# Patient Record
Sex: MALE | Race: WHITE | Employment: FULL TIME | URBAN - METROPOLITAN AREA
[De-identification: names, ages, dates, MRNs, and addresses within clinical notes are randomized per-mention and may not be internally consistent; named-entity substitution may affect disease eponyms.]

---

## 2022-04-18 ENCOUNTER — HOSPITAL ENCOUNTER (INPATIENT)
Age: 62
LOS: 2 days | Discharge: HOME | DRG: 694 | End: 2022-04-20
Attending: HOSPITALIST | Admitting: HOSPITALIST

## 2022-04-18 ENCOUNTER — APPOINTMENT (OUTPATIENT)
Dept: CT IMAGING | Age: 62
DRG: 694 | End: 2022-04-18

## 2022-04-18 DIAGNOSIS — N17.9 ACUTE KIDNEY INJURY (HCC): Primary | ICD-10-CM

## 2022-04-18 DIAGNOSIS — N20.1 CALCULUS OF URETEROVESICAL JUNCTION (UVJ): ICD-10-CM

## 2022-04-18 DIAGNOSIS — Q62.11 HYDRONEPHROSIS WITH URETEROPELVIC JUNCTION (UPJ) OBSTRUCTION: ICD-10-CM

## 2022-04-18 LAB
A/G RATIO: 1.2 (ref 1.1–2.2)
ALBUMIN SERPL-MCNC: 4.5 G/DL (ref 3.4–5)
ALP BLD-CCNC: 81 U/L (ref 40–129)
ALT SERPL-CCNC: 35 U/L (ref 10–40)
ANION GAP SERPL CALCULATED.3IONS-SCNC: 17 MMOL/L (ref 3–16)
AST SERPL-CCNC: 42 U/L (ref 15–37)
BASOPHILS ABSOLUTE: 0 K/UL (ref 0–0.2)
BASOPHILS RELATIVE PERCENT: 0.2 %
BILIRUB SERPL-MCNC: 1.3 MG/DL (ref 0–1)
BILIRUBIN URINE: NEGATIVE
BLOOD, URINE: ABNORMAL
BUN BLDV-MCNC: 34 MG/DL (ref 7–20)
CALCIUM SERPL-MCNC: 10.4 MG/DL (ref 8.3–10.6)
CHLORIDE BLD-SCNC: 96 MMOL/L (ref 99–110)
CLARITY: CLEAR
CO2: 22 MMOL/L (ref 21–32)
COLOR: YELLOW
CREAT SERPL-MCNC: 2.7 MG/DL (ref 0.8–1.3)
EOSINOPHILS ABSOLUTE: 0 K/UL (ref 0–0.6)
EOSINOPHILS RELATIVE PERCENT: 0 %
EPITHELIAL CELLS, UA: 2 /HPF (ref 0–5)
GFR AFRICAN AMERICAN: 29
GFR NON-AFRICAN AMERICAN: 24
GLUCOSE BLD-MCNC: 123 MG/DL (ref 70–99)
GLUCOSE URINE: 100 MG/DL
HCT VFR BLD CALC: 50.6 % (ref 40.5–52.5)
HEMOGLOBIN: 16.8 G/DL (ref 13.5–17.5)
HYALINE CASTS: 2 /LPF (ref 0–8)
KETONES, URINE: ABNORMAL MG/DL
LEUKOCYTE ESTERASE, URINE: NEGATIVE
LIPASE: 372 U/L (ref 13–60)
LYMPHOCYTES ABSOLUTE: 1.5 K/UL (ref 1–5.1)
LYMPHOCYTES RELATIVE PERCENT: 7.6 %
MCH RBC QN AUTO: 30.7 PG (ref 26–34)
MCHC RBC AUTO-ENTMCNC: 33.1 G/DL (ref 31–36)
MCV RBC AUTO: 92.5 FL (ref 80–100)
MICROSCOPIC EXAMINATION: YES
MONOCYTES ABSOLUTE: 1.5 K/UL (ref 0–1.3)
MONOCYTES RELATIVE PERCENT: 7.4 %
NEUTROPHILS ABSOLUTE: 17.1 K/UL (ref 1.7–7.7)
NEUTROPHILS RELATIVE PERCENT: 84.8 %
NITRITE, URINE: NEGATIVE
PDW BLD-RTO: 13.6 % (ref 12.4–15.4)
PH UA: 6 (ref 5–8)
PLATELET # BLD: 278 K/UL (ref 135–450)
PMV BLD AUTO: 7.8 FL (ref 5–10.5)
POTASSIUM REFLEX MAGNESIUM: 4.2 MMOL/L (ref 3.5–5.1)
PROTEIN UA: 100 MG/DL
RBC # BLD: 5.47 M/UL (ref 4.2–5.9)
RBC UA: 52 /HPF (ref 0–4)
SODIUM BLD-SCNC: 135 MMOL/L (ref 136–145)
SPECIFIC GRAVITY UA: 1.02 (ref 1–1.03)
TOTAL PROTEIN: 8.3 G/DL (ref 6.4–8.2)
URINE REFLEX TO CULTURE: ABNORMAL
URINE TYPE: ABNORMAL
UROBILINOGEN, URINE: 0.2 E.U./DL
WBC # BLD: 20.1 K/UL (ref 4–11)
WBC UA: 5 /HPF (ref 0–5)

## 2022-04-18 PROCEDURE — 96372 THER/PROPH/DIAG INJ SC/IM: CPT

## 2022-04-18 PROCEDURE — 99284 EMERGENCY DEPT VISIT MOD MDM: CPT

## 2022-04-18 PROCEDURE — 81001 URINALYSIS AUTO W/SCOPE: CPT

## 2022-04-18 PROCEDURE — 2580000003 HC RX 258: Performed by: HOSPITALIST

## 2022-04-18 PROCEDURE — 93005 ELECTROCARDIOGRAM TRACING: CPT | Performed by: NURSE PRACTITIONER

## 2022-04-18 PROCEDURE — 1200000000 HC SEMI PRIVATE

## 2022-04-18 PROCEDURE — 80053 COMPREHEN METABOLIC PANEL: CPT

## 2022-04-18 PROCEDURE — 6370000000 HC RX 637 (ALT 250 FOR IP): Performed by: NURSE PRACTITIONER

## 2022-04-18 PROCEDURE — 6360000002 HC RX W HCPCS: Performed by: NURSE PRACTITIONER

## 2022-04-18 PROCEDURE — 85025 COMPLETE CBC W/AUTO DIFF WBC: CPT

## 2022-04-18 PROCEDURE — 83690 ASSAY OF LIPASE: CPT

## 2022-04-18 PROCEDURE — 74176 CT ABD & PELVIS W/O CONTRAST: CPT

## 2022-04-18 PROCEDURE — 2580000003 HC RX 258: Performed by: NURSE PRACTITIONER

## 2022-04-18 PROCEDURE — 36415 COLL VENOUS BLD VENIPUNCTURE: CPT

## 2022-04-18 RX ORDER — ONDANSETRON 2 MG/ML
4 INJECTION INTRAMUSCULAR; INTRAVENOUS ONCE
Status: COMPLETED | OUTPATIENT
Start: 2022-04-18 | End: 2022-04-18

## 2022-04-18 RX ORDER — SODIUM CHLORIDE 0.9 % (FLUSH) 0.9 %
5-40 SYRINGE (ML) INJECTION PRN
Status: DISCONTINUED | OUTPATIENT
Start: 2022-04-18 | End: 2022-04-20 | Stop reason: HOSPADM

## 2022-04-18 RX ORDER — ORPHENADRINE CITRATE 30 MG/ML
60 INJECTION INTRAMUSCULAR; INTRAVENOUS ONCE
Status: COMPLETED | OUTPATIENT
Start: 2022-04-18 | End: 2022-04-18

## 2022-04-18 RX ORDER — ONDANSETRON 4 MG/1
4 TABLET, ORALLY DISINTEGRATING ORAL EVERY 8 HOURS PRN
Status: DISCONTINUED | OUTPATIENT
Start: 2022-04-18 | End: 2022-04-20 | Stop reason: HOSPADM

## 2022-04-18 RX ORDER — SODIUM CHLORIDE 9 MG/ML
INJECTION, SOLUTION INTRAVENOUS CONTINUOUS
Status: DISCONTINUED | OUTPATIENT
Start: 2022-04-18 | End: 2022-04-20 | Stop reason: HOSPADM

## 2022-04-18 RX ORDER — KETOROLAC TROMETHAMINE 30 MG/ML
30 INJECTION, SOLUTION INTRAMUSCULAR; INTRAVENOUS ONCE
Status: DISCONTINUED | OUTPATIENT
Start: 2022-04-18 | End: 2022-04-18

## 2022-04-18 RX ORDER — MORPHINE SULFATE 4 MG/ML
4 INJECTION, SOLUTION INTRAMUSCULAR; INTRAVENOUS EVERY 30 MIN PRN
Status: DISCONTINUED | OUTPATIENT
Start: 2022-04-18 | End: 2022-04-18

## 2022-04-18 RX ORDER — SODIUM CHLORIDE 0.9 % (FLUSH) 0.9 %
5-40 SYRINGE (ML) INJECTION EVERY 12 HOURS SCHEDULED
Status: DISCONTINUED | OUTPATIENT
Start: 2022-04-18 | End: 2022-04-20 | Stop reason: HOSPADM

## 2022-04-18 RX ORDER — KETOROLAC TROMETHAMINE 30 MG/ML
30 INJECTION, SOLUTION INTRAMUSCULAR; INTRAVENOUS ONCE
Status: COMPLETED | OUTPATIENT
Start: 2022-04-18 | End: 2022-04-18

## 2022-04-18 RX ORDER — SODIUM CHLORIDE 9 MG/ML
INJECTION, SOLUTION INTRAVENOUS PRN
Status: DISCONTINUED | OUTPATIENT
Start: 2022-04-18 | End: 2022-04-20 | Stop reason: HOSPADM

## 2022-04-18 RX ORDER — MORPHINE SULFATE 2 MG/ML
2 INJECTION, SOLUTION INTRAMUSCULAR; INTRAVENOUS EVERY 4 HOURS PRN
Status: DISCONTINUED | OUTPATIENT
Start: 2022-04-18 | End: 2022-04-20 | Stop reason: HOSPADM

## 2022-04-18 RX ORDER — OXYCODONE HYDROCHLORIDE AND ACETAMINOPHEN 5; 325 MG/1; MG/1
1 TABLET ORAL EVERY 4 HOURS PRN
Status: DISCONTINUED | OUTPATIENT
Start: 2022-04-18 | End: 2022-04-20 | Stop reason: HOSPADM

## 2022-04-18 RX ORDER — POLYETHYLENE GLYCOL 3350 17 G/17G
17 POWDER, FOR SOLUTION ORAL DAILY PRN
Status: DISCONTINUED | OUTPATIENT
Start: 2022-04-18 | End: 2022-04-20 | Stop reason: HOSPADM

## 2022-04-18 RX ORDER — ONDANSETRON 4 MG/1
4 TABLET, ORALLY DISINTEGRATING ORAL ONCE
Status: COMPLETED | OUTPATIENT
Start: 2022-04-18 | End: 2022-04-18

## 2022-04-18 RX ORDER — 0.9 % SODIUM CHLORIDE 0.9 %
1000 INTRAVENOUS SOLUTION INTRAVENOUS ONCE
Status: COMPLETED | OUTPATIENT
Start: 2022-04-18 | End: 2022-04-18

## 2022-04-18 RX ORDER — ACETAMINOPHEN 650 MG/1
650 SUPPOSITORY RECTAL EVERY 6 HOURS PRN
Status: DISCONTINUED | OUTPATIENT
Start: 2022-04-18 | End: 2022-04-20 | Stop reason: HOSPADM

## 2022-04-18 RX ORDER — ONDANSETRON 2 MG/ML
4 INJECTION INTRAMUSCULAR; INTRAVENOUS EVERY 6 HOURS PRN
Status: DISCONTINUED | OUTPATIENT
Start: 2022-04-18 | End: 2022-04-20 | Stop reason: HOSPADM

## 2022-04-18 RX ORDER — ACETAMINOPHEN 325 MG/1
650 TABLET ORAL EVERY 6 HOURS PRN
Status: DISCONTINUED | OUTPATIENT
Start: 2022-04-18 | End: 2022-04-20 | Stop reason: HOSPADM

## 2022-04-18 RX ADMIN — KETOROLAC TROMETHAMINE 30 MG: 30 INJECTION, SOLUTION INTRAMUSCULAR at 15:36

## 2022-04-18 RX ADMIN — ONDANSETRON 4 MG: 2 INJECTION INTRAMUSCULAR; INTRAVENOUS at 18:47

## 2022-04-18 RX ADMIN — SODIUM CHLORIDE 1000 ML: 9 INJECTION, SOLUTION INTRAVENOUS at 18:48

## 2022-04-18 RX ADMIN — MORPHINE SULFATE 4 MG: 4 INJECTION INTRAVENOUS at 19:47

## 2022-04-18 RX ADMIN — ORPHENADRINE CITRATE 60 MG: 30 INJECTION INTRAMUSCULAR; INTRAVENOUS at 15:35

## 2022-04-18 RX ADMIN — ONDANSETRON 4 MG: 4 TABLET, ORALLY DISINTEGRATING ORAL at 15:36

## 2022-04-18 RX ADMIN — SODIUM CHLORIDE, PRESERVATIVE FREE 10 ML: 5 INJECTION INTRAVENOUS at 22:14

## 2022-04-18 RX ADMIN — SODIUM CHLORIDE: 9 INJECTION, SOLUTION INTRAVENOUS at 22:13

## 2022-04-18 ASSESSMENT — PAIN SCALES - GENERAL
PAINLEVEL_OUTOF10: 9
PAINLEVEL_OUTOF10: 0
PAINLEVEL_OUTOF10: 8
PAINLEVEL_OUTOF10: 0

## 2022-04-18 ASSESSMENT — ENCOUNTER SYMPTOMS
ABDOMINAL PAIN: 1
COLOR CHANGE: 0
DIARRHEA: 0
WHEEZING: 0
SHORTNESS OF BREATH: 0
NAUSEA: 0
BACK PAIN: 0
VOMITING: 0
COUGH: 0

## 2022-04-18 ASSESSMENT — PAIN - FUNCTIONAL ASSESSMENT: PAIN_FUNCTIONAL_ASSESSMENT: 0-10

## 2022-04-18 NOTE — PROGRESS NOTES
Pt seen in  ED, admission completed. Pt is alert and oriented x 4. Pt lives at home alone, and is being admitted for JOE. Plan of care updated, all questions answered.

## 2022-04-18 NOTE — ED PROVIDER NOTES
**ADVANCED PRACTICE PROVIDER, I HAVE EVALUATED THIS PATIENT**        [address] ENCOUNTER      Pt Name: Sukhdeep Fabian  IYL:[id number]  Andre 1960  Date of evaluation: 4/18/2022  Provider: Wilfrid Schilder, APRN - CNP      Chief Complaint:    Chief Complaint   Patient presents with    Flank Pain     Left flank pain radiating to the groin with difficulty urination for two days; fevers, vomitting & has not urinated in 5 hours. Hx of kidney stones with lithotripsy in past.           Nursing Notes, Past Medical Hx, Past Surgical Hx, Social Hx, Allergies, and Family Hx were all reviewed and agreed with or any disagreements were addressed in the HPI.    HPI: (Location, Duration, Timing, Severity, Quality, Assoc Sx, Context, Modifying factors)    Chief Complaint of left flank pain that wraps around to the abdomen    This is a  57 y.o. male who presents to the emergency department left flank pain that wraps around to the abdomen that started about 2 days ago however its gotten worse in the past 24 hours, has not been able to urinate for the past 5 hours, thinks he has a kidney stone again, he has had lithotripsies and stones in the past.  He reports nausea and vomiting but no diarrhea or blood in stool. Had a bowel movement but its been several days, he thought maybe he was constipated to. He is not taking any medicine for his pain. No cough, congestion, fever or chills. Rates the pain a 9 on a 10, has not taken any medicine but some Tylenol for the pain. Patient is a  he has not been to the doctor since 2015. He denies any cough, congestion, fever or chills. He denies any additional complaints, no additional aggravating relieving factors. Patient presents awake, alert and in no acute respiratory distress or toxic appearance    PastMedical/Surgical History:  History reviewed. No pertinent past medical history.       Procedure Laterality Date    LITHOTRIPSY         Medications:  Previous Medications    No medications on file         Review of Systems:  (2-9 systems needed)  Review of Systems   Constitutional: Negative for chills and fever. HENT: Negative for congestion. Respiratory: Negative for cough, shortness of breath and wheezing. Cardiovascular: Negative for chest pain. Gastrointestinal: Positive for abdominal pain. Negative for diarrhea, nausea and vomiting. Patient complains of left flank pain that wraps around to the abdomen that started about 2 days ago however its gotten worse in the past 24 hours, has not been able to urinate for the past 5 hours, thinks he has a kidney stone again, he has had lithotripsies and stones in the past.  He reports nausea and vomiting but no diarrhea or blood in stool. Genitourinary: Positive for flank pain. Negative for difficulty urinating, dysuria, frequency, hematuria and urgency. Musculoskeletal: Negative for back pain. Skin: Negative for color change. Neurological: Negative for weakness, numbness and headaches. \"Positives and Pertinent negatives as per HPI\"    Physical Exam:  Physical Exam  Vitals and nursing note reviewed. Constitutional:       Appearance: He is well-developed. He is not diaphoretic. HENT:      Head: Normocephalic. Right Ear: External ear normal.      Left Ear: External ear normal.   Eyes:      General: No scleral icterus. Right eye: No discharge. Left eye: No discharge. Conjunctiva/sclera: Conjunctivae normal.   Cardiovascular:      Comments: Normal S1 and 2, initially tachycardic at 118 bpm, however he is in pain and appears to be slightly agitated due to the pain. However he has no peripheral edema  Pulmonary:      Effort: Pulmonary effort is normal. No respiratory distress.       Comments: Lungs are clear anteriorly, diminished posteriorly in the bases, he is not tachypneic or dyspneic and saturations are 96% on  Abdominal:      General: Bowel sounds are normal.      Tenderness: There is right CVA tenderness and left CVA tenderness. Comments: Abdomen is soft and nondistended. Bowel sounds are active, he does have right and left-sided CVA tenderness with the left being greater than the right. He has no rebound tenderness on exam.  No ascites or rigidity. No rebound tenderness at McBurney's point   Musculoskeletal:         General: Normal range of motion. Cervical back: Normal range of motion and neck supple. Skin:     General: Skin is warm. Coloration: Skin is not pale. Neurological:      General: No focal deficit present. Mental Status: He is alert and oriented to person, place, and time. GCS: GCS eye subscore is 4. GCS verbal subscore is 5. GCS motor subscore is 6.       Comments: Patient is awake, alert following commands correctly, neurology intact no focal deficit   Psychiatric:         Behavior: Behavior normal.         MEDICAL DECISION MAKING    Vitals:    Vitals:    04/18/22 1526 04/18/22 1822   BP: (!) 158/105 (!) 141/97   Pulse: 125 120   Resp: 22    Temp: 98.5 °F (36.9 °C)    SpO2: 96% 94%   Weight: 204 lb (92.5 kg)        LABS:  Labs Reviewed   CBC WITH AUTO DIFFERENTIAL - Abnormal; Notable for the following components:       Result Value    WBC 20.1 (*)     Neutrophils Absolute 17.1 (*)     Monocytes Absolute 1.5 (*)     All other components within normal limits   COMPREHENSIVE METABOLIC PANEL W/ REFLEX TO MG FOR LOW K - Abnormal; Notable for the following components:    Sodium 135 (*)     Chloride 96 (*)     Anion Gap 17 (*)     Glucose 123 (*)     BUN 34 (*)     CREATININE 2.7 (*)     GFR Non- 24 (*)     GFR  29 (*)     Total Protein 8.3 (*)     Total Bilirubin 1.3 (*)     AST 42 (*)     All other components within normal limits   URINALYSIS WITH REFLEX TO CULTURE - Abnormal; Notable for the following components:    Glucose, Ur 100 (*) Ketones, Urine TRACE (*)     Blood, Urine SMALL (*)     Protein,  (*)     All other components within normal limits   LIPASE - Abnormal; Notable for the following components:    Lipase 372.0 (*)     All other components within normal limits   MICROSCOPIC URINALYSIS - Abnormal; Notable for the following components:    RBC, UA 52 (*)     All other components within normal limits        Remainder of labs reviewed and were negative at this time or not returned at the time of this note. RADIOLOGY:   Non-plain film images such as CT, Ultrasound and MRI are read by the radiologist. Areli MURDOCK, APRN - CNP have directly visualized the radiologic plain film image(s) with the below findings:      Interpretation per the Radiologist below, if available at the time of this note:    CT ABDOMEN PELVIS WO CONTRAST Additional Contrast? None   Final Result   Mild-to-moderate hydronephrosis and mild hydroureter bilaterally seen down to   the bladder which is more prominent on the right. There is a 15 mm stone   lodged in the ureteral vesicle junction on the right. Moderate distention of the urinary bladder which may represent an element of   bladder outlet obstruction with moderate prostatic enlargement. Recommend   urological follow-up. Questionable diffuse mild ileus. Status post cholecystectomy with calcifications throughout the pancreas in   keeping with chronic calcific pancreatitis with no obvious active   inflammation or mass. Moderate adrenal thickening on the left which could be due to hyperplasia. Recommend correlating with lab values. Multiple cystic masses in both kidneys with a 4 cm hyperdense mass upper pole   left kidney which probably represents a hemorrhagic cyst.  Recommend   ultrasound correlation.                  MEDICAL DECISION MAKING / ED COURSE:    Because of high probability of sudden clinical deterioration of the patient's condition and risk of further deterioration, critical care time required my full attention to the patient's condition; which included chart data review, documentation, medication ordering, reviewing the patient's old records, reevaluation patient's cardiac, pulmonary and neurological status. Reevaluation of vital signs. Consultations with ED attending and admitting physician. Ordering, interpreting reviewing diagnostic testing. Therefore a critical care time was 35 minutes of direct attention to the patient's condition did not include time spent on procedures. PROCEDURES:   Procedures    None    Patient was given:  Medications   0.9 % sodium chloride bolus (has no administration in time range)   ondansetron (ZOFRAN) injection 4 mg (has no administration in time range)   morphine injection 4 mg (has no administration in time range)   ketorolac (TORADOL) injection 30 mg (30 mg IntraMUSCular Given 4/18/22 1536)   orphenadrine (NORFLEX) injection 60 mg (60 mg IntraMUSCular Given 4/18/22 1535)   ondansetron (ZOFRAN-ODT) disintegrating tablet 4 mg (4 mg Oral Given 4/18/22 1536)     Patient complains of left flank pain that wraps around to the abdomen that started about 2 days ago however its gotten worse in the past 24 hours, has not been able to urinate for the past 5 hours, thinks he has a kidney stone again, he has had lithotripsies and stones in the past.  He reports nausea and vomiting but no diarrhea or blood in stool. After evaluation and examination the patient IV access, blood work, urinalysis, and CT scan of the abdomen were ordered. CBC shows a leukocytosis with a white count 20,100, no acute anemia. Metabolic panel shows a new JOE with a BUN of 34, creatinine 2.7 and GFR of 24, I attempted to try to find labs with him in the past however he is not have any lab work done since 2015, patient does report he is a  and does not typically go to the doctor. Gap of 17 should improve with fluids.   Liver functions, bilirubin is 1.3, ALT is normal, AST is slightly elevated at 42 however his lipase is elevated at 372, patient is denying any abdominal discomfort in the epigastric region his pain is mostly in the flank region. Urinalysis shows hematuria with some glucosuria however no specific infection. The proteinuria is most likely related to his kidney function. CT the abdomen and pelvis shows a mild to moderate hydronephrosis and mild ureter bilaterally seen all the way down to the bladder prominent on the right, he has a 15 mm stone lodged in the UVJ on the right side. Moderate distention of the urinary bladder concerning of an element of bladder outlet obstruction, he does have moderate prostate enlargement. There is a questionable diffuse mild ileus however patient states he has been having bowel movements, had a normal one yesterday. He is already had a cholecystectomy. Multiple cystic mass on both kidneys with 4 cm hyperdense mass upper pole of the left kidney associated to hemorrhagic cyst.  However, patient was brought back to the room, I paged urology on-call, I spoke with the patient about being admitted to the hospital, he agrees with this plan. At 1818 I spoke with Dr. Pablo Villatoro, urology on call, we discused patient's case at length, he agrees with the plan of care that he needs to be admitted for stenting, along with JOE. I then paged hospitalist on-call for admission. Patient to be NPO after midnight and patient to be admitted to the hospital.  However when patient was brought to the room he is still tachycardic, I then ordered EKG, fluids, pain medicine, antiemetics. Patient was given Toradol prior to the kidney function results. EKG was obtained, sinus tachycardia rate of 102 bpm, occasional PACs, no acute ST elevation, please see the attending physician documentation for EKG interpretation note. The patient tolerated their visit well. I evaluated the patient.   The physician was available for consultation as needed. The patient and / or the family were informed of the results of any tests, a time was given to answer questions, a plan was proposed and they agreed with plan. Otherwise patient will be admitted to the hospital for further evaluation management of care. CLINICAL IMPRESSION:  1. Acute kidney injury ([address]    2. Calculus of ureterovesical junction (UVJ)    3. Hydronephrosis with ureteropelvic junction (UPJ) obstruction        DISPOSITION Admitted 04/18/2022 06:27:55 PM      PATIENT REFERRED TO:  No follow-up provider specified.     DISCHARGE MEDICATIONS:  New Prescriptions    No medications on file       DISCONTINUED MEDICATIONS:  Discontinued Medications    No medications on file              (Please note the MDM and HPI sections of this note were completed with a voice recognition program.  Efforts were made to edit the dictations but occasionally words are mis-transcribed.)    Electronically signed, KIM Omalley CNP,           KIM Omalley CNP  04/18/22 3536

## 2022-04-18 NOTE — H&P
HOSPITALISTS HISTORY AND PHYSICAL    4/18/2022 6:40 PM    Patient Information:  Ester Mccain is a 57 y.o. male [id number]  PCP:  No primary care provider on file. (Tel: None )    Chief complaint:    Chief Complaint   Patient presents with    Flank Pain     Left flank pain radiating to the groin with difficulty urination for two days; fevers, vomitting & has not urinated in 5 hours. Hx of kidney stones with lithotripsy in past.         History of Present Illness:  Paula Pizarro is a 57 y.o. male who presented with flank pain. Radiating to the groin. Onset of symptoms about 2 days ago. Started some fevers vomiting. Patient said he has not urinated for past 5 hours. Patient does have history of kidney stones and feels similar to that. Patient pain is described 10 out of 10 throbbing pain. Patient denies any significant other medical history. Nothing that makes it better. Not take any medicine  REVIEW OF SYSTEMS:   Constitutional: Negative for fever,chills or night sweats  ENT: Negative for rhinorrhea, epistaxis, hoarseness, sore throat. Respiratory: Negative for shortness of breath,wheezing  Cardiovascular: Negative for chest pain, palpitations   Gastrointestinal: Negative for nausea, vomiting, diarrhea  Genitourinary: Negative for polyuria, dysuria   Hematologic/Lymphatic: Negative for bleeding tendency, easy bruising  Musculoskeletal: Negative for myalgias and arthralgias  Neurologic: Negative for confusion,dysarthria. Skin: Negative for itching,rash, good capillary refill. Psychiatric: Negative for depression,anxiety, agitation. Endocrine: Negative for polydipsia,polyuria,heat /cold intolerance. Past Medical History:   has no past medical history on file. Past Surgical History:   has a past surgical history that includes Lithotripsy. Medications:  No current facility-administered medications on file prior to encounter.      No current outpatient medications on file prior to encounter. Allergies:  No Known Allergies     Social History:   reports that he has never smoked. He does not have any smokeless tobacco history on file. He reports previous alcohol use. He reports previous drug use. Family History:  Dad- HTN  Physical Exam:  BP (!) 141/97   Pulse 120   Temp 98.5 °F (36.9 °C)   Resp 22   Wt 204 lb (92.5 kg)   SpO2 94%     General appearance:  Appears comfortable. Well nourished  Eyes: Sclera clear, pupils equal  ENT: Moist mucus membranes, no thrush. Trachea midline. Cardiovascular: Regular rhythm, normal S1, S2. No murmur, gallop, rub. No edema in lower extremities  Respiratory: Clear to auscultation bilaterally, no wheeze, good inspiratory effort  Gastrointestinal: Abdomen soft, non-tender, not distended, normal bowel sounds  Musculoskeletal: No cyanosis in digits, neck supple  Neurology: Cranial nerves grossly intact. Alert and oriented in time, place and person. No speech or motor deficits  Psychiatry: Appropriate affect. Not agitated  Skin: Warm, dry, normal turgor, no rash    Labs:  CBC:   Lab Results   Component Value Date    WBC 20.1 04/18/2022    RBC 5.47 04/18/2022    HGB 16.8 04/18/2022    HCT 50.6 04/18/2022    MCV 92.5 04/18/2022    MCH 30.7 04/18/2022    MCHC 33.1 04/18/2022    RDW 13.6 04/18/2022     04/18/2022    MPV 7.8 04/18/2022     BMP:    Lab Results   Component Value Date     04/18/2022    K 4.2 04/18/2022    CL 96 04/18/2022    CO2 22 04/18/2022    BUN 34 04/18/2022    CREATININE 2.7 04/18/2022    CALCIUM 10.4 04/18/2022    GFRAA 29 04/18/2022    LABGLOM 24 04/18/2022    GLUCOSE 123 04/18/2022       Chest Xray:   EKG:    I visualized CXR images and EKG strips     Discussed  with     Problem List  Principal Problem:    JOE (acute kidney injury) ([address]  Resolved Problems:    * No resolved hospital problems.  *        Assessment/Plan:   Renal stone with obstruction  -15 mm stone known.  -Surgery was consulted will be admitted n.p.o. after midnight plan for stent placement tomorrow  -Pain medication IV n.p.o. try to avoid NSAIDs      Acute kidney  -This seems to be obstructive.   Monitor renal function IV hydration full  -Repeat labs in the      Vanessa Dumont MD    4/18/2022 6:40 PM

## 2022-04-19 ENCOUNTER — APPOINTMENT (OUTPATIENT)
Dept: ULTRASOUND IMAGING | Age: 62
DRG: 694 | End: 2022-04-19

## 2022-04-19 ENCOUNTER — ANESTHESIA (OUTPATIENT)
Dept: OPERATING ROOM | Age: 62
DRG: 694 | End: 2022-04-19

## 2022-04-19 ENCOUNTER — ANESTHESIA EVENT (OUTPATIENT)
Dept: OPERATING ROOM | Age: 62
DRG: 694 | End: 2022-04-19

## 2022-04-19 ENCOUNTER — APPOINTMENT (OUTPATIENT)
Dept: GENERAL RADIOLOGY | Age: 62
DRG: 694 | End: 2022-04-19

## 2022-04-19 VITALS
SYSTOLIC BLOOD PRESSURE: 89 MMHG | DIASTOLIC BLOOD PRESSURE: 54 MMHG | OXYGEN SATURATION: 100 % | RESPIRATION RATE: 10 BRPM

## 2022-04-19 LAB
ANION GAP SERPL CALCULATED.3IONS-SCNC: 14 MMOL/L (ref 3–16)
BASOPHILS ABSOLUTE: 0 K/UL (ref 0–0.2)
BASOPHILS RELATIVE PERCENT: 0.3 %
BUN BLDV-MCNC: 53 MG/DL (ref 7–20)
CALCIUM SERPL-MCNC: 9.5 MG/DL (ref 8.3–10.6)
CHLORIDE BLD-SCNC: 100 MMOL/L (ref 99–110)
CO2: 23 MMOL/L (ref 21–32)
CREAT SERPL-MCNC: 3.7 MG/DL (ref 0.8–1.3)
EKG ATRIAL RATE: 102 BPM
EKG DIAGNOSIS: NORMAL
EKG P AXIS: 48 DEGREES
EKG P-R INTERVAL: 130 MS
EKG Q-T INTERVAL: 330 MS
EKG QRS DURATION: 90 MS
EKG QTC CALCULATION (BAZETT): 430 MS
EKG R AXIS: 69 DEGREES
EKG T AXIS: 46 DEGREES
EKG VENTRICULAR RATE: 102 BPM
EOSINOPHILS ABSOLUTE: 0 K/UL (ref 0–0.6)
EOSINOPHILS RELATIVE PERCENT: 0.1 %
GFR AFRICAN AMERICAN: 20
GFR NON-AFRICAN AMERICAN: 17
GLUCOSE BLD-MCNC: 106 MG/DL (ref 70–99)
HCT VFR BLD CALC: 46.5 % (ref 40.5–52.5)
HEMOGLOBIN: 15.3 G/DL (ref 13.5–17.5)
LYMPHOCYTES ABSOLUTE: 1.4 K/UL (ref 1–5.1)
LYMPHOCYTES RELATIVE PERCENT: 10 %
MCH RBC QN AUTO: 31 PG (ref 26–34)
MCHC RBC AUTO-ENTMCNC: 32.9 G/DL (ref 31–36)
MCV RBC AUTO: 94.2 FL (ref 80–100)
MONOCYTES ABSOLUTE: 0.8 K/UL (ref 0–1.3)
MONOCYTES RELATIVE PERCENT: 6 %
NEUTROPHILS ABSOLUTE: 11.4 K/UL (ref 1.7–7.7)
NEUTROPHILS RELATIVE PERCENT: 83.6 %
PDW BLD-RTO: 13.9 % (ref 12.4–15.4)
PLATELET # BLD: 214 K/UL (ref 135–450)
PMV BLD AUTO: 7.5 FL (ref 5–10.5)
POTASSIUM SERPL-SCNC: 4.5 MMOL/L (ref 3.5–5.1)
RBC # BLD: 4.94 M/UL (ref 4.2–5.9)
SODIUM BLD-SCNC: 137 MMOL/L (ref 136–145)
WBC # BLD: 13.6 K/UL (ref 4–11)

## 2022-04-19 PROCEDURE — 36415 COLL VENOUS BLD VENIPUNCTURE: CPT

## 2022-04-19 PROCEDURE — 80048 BASIC METABOLIC PNL TOTAL CA: CPT

## 2022-04-19 PROCEDURE — 3600000014 HC SURGERY LEVEL 4 ADDTL 15MIN: Performed by: UROLOGY

## 2022-04-19 PROCEDURE — 2500000003 HC RX 250 WO HCPCS: Performed by: NURSE ANESTHETIST, CERTIFIED REGISTERED

## 2022-04-19 PROCEDURE — 6360000002 HC RX W HCPCS: Performed by: UROLOGY

## 2022-04-19 PROCEDURE — 2709999900 HC NON-CHARGEABLE SUPPLY: Performed by: UROLOGY

## 2022-04-19 PROCEDURE — 3600000004 HC SURGERY LEVEL 4 BASE: Performed by: UROLOGY

## 2022-04-19 PROCEDURE — 6370000000 HC RX 637 (ALT 250 FOR IP): Performed by: UROLOGY

## 2022-04-19 PROCEDURE — C1758 CATHETER, URETERAL: HCPCS | Performed by: UROLOGY

## 2022-04-19 PROCEDURE — 3700000001 HC ADD 15 MINUTES (ANESTHESIA): Performed by: UROLOGY

## 2022-04-19 PROCEDURE — 1200000000 HC SEMI PRIVATE

## 2022-04-19 PROCEDURE — 51702 INSERT TEMP BLADDER CATH: CPT

## 2022-04-19 PROCEDURE — 94761 N-INVAS EAR/PLS OXIMETRY MLT: CPT

## 2022-04-19 PROCEDURE — 0TCB8ZZ EXTIRPATION OF MATTER FROM BLADDER, VIA NATURAL OR ARTIFICIAL OPENING ENDOSCOPIC: ICD-10-PCS | Performed by: UROLOGY

## 2022-04-19 PROCEDURE — 7100000001 HC PACU RECOVERY - ADDTL 15 MIN: Performed by: UROLOGY

## 2022-04-19 PROCEDURE — 2580000003 HC RX 258: Performed by: HOSPITALIST

## 2022-04-19 PROCEDURE — 85025 COMPLETE CBC W/AUTO DIFF WBC: CPT

## 2022-04-19 PROCEDURE — 6360000002 HC RX W HCPCS: Performed by: HOSPITALIST

## 2022-04-19 PROCEDURE — 84153 ASSAY OF PSA TOTAL: CPT

## 2022-04-19 PROCEDURE — 7100000000 HC PACU RECOVERY - FIRST 15 MIN: Performed by: UROLOGY

## 2022-04-19 PROCEDURE — 76770 US EXAM ABDO BACK WALL COMP: CPT

## 2022-04-19 PROCEDURE — C1769 GUIDE WIRE: HCPCS | Performed by: UROLOGY

## 2022-04-19 PROCEDURE — 2580000003 HC RX 258: Performed by: UROLOGY

## 2022-04-19 PROCEDURE — 3700000000 HC ANESTHESIA ATTENDED CARE: Performed by: UROLOGY

## 2022-04-19 PROCEDURE — 82365 CALCULUS SPECTROSCOPY: CPT

## 2022-04-19 PROCEDURE — 6360000002 HC RX W HCPCS: Performed by: NURSE ANESTHETIST, CERTIFIED REGISTERED

## 2022-04-19 PROCEDURE — 93010 ELECTROCARDIOGRAM REPORT: CPT | Performed by: INTERNAL MEDICINE

## 2022-04-19 PROCEDURE — 2720000010 HC SURG SUPPLY STERILE: Performed by: UROLOGY

## 2022-04-19 RX ORDER — TAMSULOSIN HYDROCHLORIDE 0.4 MG/1
0.4 CAPSULE ORAL DAILY
Status: DISCONTINUED | OUTPATIENT
Start: 2022-04-19 | End: 2022-04-20 | Stop reason: HOSPADM

## 2022-04-19 RX ORDER — DIPHENHYDRAMINE HYDROCHLORIDE 50 MG/ML
12.5 INJECTION INTRAMUSCULAR; INTRAVENOUS
Status: DISCONTINUED | OUTPATIENT
Start: 2022-04-19 | End: 2022-04-19 | Stop reason: HOSPADM

## 2022-04-19 RX ORDER — SODIUM CHLORIDE 9 MG/ML
INJECTION, SOLUTION INTRAVENOUS PRN
Status: DISCONTINUED | OUTPATIENT
Start: 2022-04-19 | End: 2022-04-19 | Stop reason: HOSPADM

## 2022-04-19 RX ORDER — PROPOFOL 10 MG/ML
INJECTION, EMULSION INTRAVENOUS PRN
Status: DISCONTINUED | OUTPATIENT
Start: 2022-04-19 | End: 2022-04-19 | Stop reason: SDUPTHER

## 2022-04-19 RX ORDER — HYDROMORPHONE HYDROCHLORIDE 1 MG/ML
0.5 INJECTION, SOLUTION INTRAMUSCULAR; INTRAVENOUS; SUBCUTANEOUS ONCE
Status: COMPLETED | OUTPATIENT
Start: 2022-04-19 | End: 2022-04-19

## 2022-04-19 RX ORDER — SODIUM CHLORIDE 0.9 % (FLUSH) 0.9 %
5-40 SYRINGE (ML) INJECTION PRN
Status: DISCONTINUED | OUTPATIENT
Start: 2022-04-19 | End: 2022-04-19 | Stop reason: HOSPADM

## 2022-04-19 RX ORDER — ONDANSETRON 2 MG/ML
4 INJECTION INTRAMUSCULAR; INTRAVENOUS
Status: DISCONTINUED | OUTPATIENT
Start: 2022-04-19 | End: 2022-04-19 | Stop reason: HOSPADM

## 2022-04-19 RX ORDER — MAGNESIUM HYDROXIDE 1200 MG/15ML
LIQUID ORAL
Status: COMPLETED | OUTPATIENT
Start: 2022-04-19 | End: 2022-04-19

## 2022-04-19 RX ORDER — ONDANSETRON 2 MG/ML
INJECTION INTRAMUSCULAR; INTRAVENOUS PRN
Status: DISCONTINUED | OUTPATIENT
Start: 2022-04-19 | End: 2022-04-19 | Stop reason: SDUPTHER

## 2022-04-19 RX ORDER — SODIUM CHLORIDE 0.9 % (FLUSH) 0.9 %
5-40 SYRINGE (ML) INJECTION EVERY 12 HOURS SCHEDULED
Status: DISCONTINUED | OUTPATIENT
Start: 2022-04-19 | End: 2022-04-19 | Stop reason: HOSPADM

## 2022-04-19 RX ORDER — MEPERIDINE HYDROCHLORIDE 25 MG/ML
12.5 INJECTION INTRAMUSCULAR; INTRAVENOUS; SUBCUTANEOUS EVERY 5 MIN PRN
Status: DISCONTINUED | OUTPATIENT
Start: 2022-04-19 | End: 2022-04-19 | Stop reason: HOSPADM

## 2022-04-19 RX ORDER — HYDROMORPHONE HCL 110MG/55ML
0.25 PATIENT CONTROLLED ANALGESIA SYRINGE INTRAVENOUS EVERY 5 MIN PRN
Status: DISCONTINUED | OUTPATIENT
Start: 2022-04-19 | End: 2022-04-19 | Stop reason: HOSPADM

## 2022-04-19 RX ORDER — FENTANYL CITRATE 50 UG/ML
INJECTION, SOLUTION INTRAMUSCULAR; INTRAVENOUS PRN
Status: DISCONTINUED | OUTPATIENT
Start: 2022-04-19 | End: 2022-04-19 | Stop reason: SDUPTHER

## 2022-04-19 RX ORDER — LIDOCAINE HYDROCHLORIDE 20 MG/ML
INJECTION, SOLUTION EPIDURAL; INFILTRATION; INTRACAUDAL; PERINEURAL PRN
Status: DISCONTINUED | OUTPATIENT
Start: 2022-04-19 | End: 2022-04-19 | Stop reason: SDUPTHER

## 2022-04-19 RX ORDER — HYDROMORPHONE HCL 110MG/55ML
0.5 PATIENT CONTROLLED ANALGESIA SYRINGE INTRAVENOUS EVERY 5 MIN PRN
Status: DISCONTINUED | OUTPATIENT
Start: 2022-04-19 | End: 2022-04-19 | Stop reason: HOSPADM

## 2022-04-19 RX ORDER — DEXAMETHASONE SODIUM PHOSPHATE 4 MG/ML
INJECTION, SOLUTION INTRA-ARTICULAR; INTRALESIONAL; INTRAMUSCULAR; INTRAVENOUS; SOFT TISSUE PRN
Status: DISCONTINUED | OUTPATIENT
Start: 2022-04-19 | End: 2022-04-19 | Stop reason: SDUPTHER

## 2022-04-19 RX ORDER — CEFAZOLIN SODIUM 2 G/100ML
2000 INJECTION, SOLUTION INTRAVENOUS ONCE
Status: DISCONTINUED | OUTPATIENT
Start: 2022-04-19 | End: 2022-04-19

## 2022-04-19 RX ADMIN — SODIUM CHLORIDE: 9 INJECTION, SOLUTION INTRAVENOUS at 10:50

## 2022-04-19 RX ADMIN — CEFAZOLIN 2000 MG: 10 INJECTION, POWDER, FOR SOLUTION INTRAVENOUS at 12:04

## 2022-04-19 RX ADMIN — MORPHINE SULFATE 2 MG: 2 INJECTION, SOLUTION INTRAMUSCULAR; INTRAVENOUS at 08:25

## 2022-04-19 RX ADMIN — FENTANYL CITRATE 50 MCG: 50 INJECTION, SOLUTION INTRAMUSCULAR; INTRAVENOUS at 12:08

## 2022-04-19 RX ADMIN — OXYCODONE AND ACETAMINOPHEN 1 TABLET: 5; 325 TABLET ORAL at 19:07

## 2022-04-19 RX ADMIN — MORPHINE SULFATE 2 MG: 2 INJECTION, SOLUTION INTRAMUSCULAR; INTRAVENOUS at 14:12

## 2022-04-19 RX ADMIN — CEFAZOLIN 2000 MG: 10 INJECTION, POWDER, FOR SOLUTION INTRAVENOUS at 12:08

## 2022-04-19 RX ADMIN — DEXAMETHASONE SODIUM PHOSPHATE 4 MG: 4 INJECTION, SOLUTION INTRAMUSCULAR; INTRAVENOUS at 12:18

## 2022-04-19 RX ADMIN — TAMSULOSIN HYDROCHLORIDE 0.4 MG: 0.4 CAPSULE ORAL at 15:26

## 2022-04-19 RX ADMIN — LIDOCAINE HYDROCHLORIDE 100 MG: 20 INJECTION, SOLUTION EPIDURAL; INFILTRATION; INTRACAUDAL; PERINEURAL at 12:08

## 2022-04-19 RX ADMIN — PROPOFOL 200 MG: 10 INJECTION, EMULSION INTRAVENOUS at 12:10

## 2022-04-19 RX ADMIN — ONDANSETRON 4 MG: 2 INJECTION INTRAMUSCULAR; INTRAVENOUS at 12:18

## 2022-04-19 RX ADMIN — FENTANYL CITRATE 50 MCG: 50 INJECTION, SOLUTION INTRAMUSCULAR; INTRAVENOUS at 12:19

## 2022-04-19 RX ADMIN — HYDROMORPHONE HYDROCHLORIDE 0.5 MG: 1 INJECTION, SOLUTION INTRAMUSCULAR; INTRAVENOUS; SUBCUTANEOUS at 10:41

## 2022-04-19 ASSESSMENT — PAIN SCALES - GENERAL
PAINLEVEL_OUTOF10: 7
PAINLEVEL_OUTOF10: 8
PAINLEVEL_OUTOF10: 8
PAINLEVEL_OUTOF10: 0
PAINLEVEL_OUTOF10: 9
PAINLEVEL_OUTOF10: 0
PAINLEVEL_OUTOF10: 6
PAINLEVEL_OUTOF10: 7

## 2022-04-19 ASSESSMENT — PULMONARY FUNCTION TESTS
PIF_VALUE: 2
PIF_VALUE: 2
PIF_VALUE: 3
PIF_VALUE: 2
PIF_VALUE: 3
PIF_VALUE: 3
PIF_VALUE: 14
PIF_VALUE: 1
PIF_VALUE: 5
PIF_VALUE: 0
PIF_VALUE: 2
PIF_VALUE: 7
PIF_VALUE: 2
PIF_VALUE: 14
PIF_VALUE: 2
PIF_VALUE: 3
PIF_VALUE: 0
PIF_VALUE: 3
PIF_VALUE: 0
PIF_VALUE: 25
PIF_VALUE: 2
PIF_VALUE: 3
PIF_VALUE: 25
PIF_VALUE: 3
PIF_VALUE: 2
PIF_VALUE: 2
PIF_VALUE: 3
PIF_VALUE: 5
PIF_VALUE: 14
PIF_VALUE: 25
PIF_VALUE: 1
PIF_VALUE: 2
PIF_VALUE: 2
PIF_VALUE: 3
PIF_VALUE: 1
PIF_VALUE: 6
PIF_VALUE: 4

## 2022-04-19 ASSESSMENT — PAIN DESCRIPTION - ONSET: ONSET: ON-GOING

## 2022-04-19 ASSESSMENT — PAIN - FUNCTIONAL ASSESSMENT
PAIN_FUNCTIONAL_ASSESSMENT: 0-10
PAIN_FUNCTIONAL_ASSESSMENT: ACTIVITIES ARE NOT PREVENTED

## 2022-04-19 ASSESSMENT — PAIN DESCRIPTION - FREQUENCY: FREQUENCY: INTERMITTENT

## 2022-04-19 ASSESSMENT — PAIN DESCRIPTION - PROGRESSION: CLINICAL_PROGRESSION: NOT CHANGED

## 2022-04-19 ASSESSMENT — PAIN DESCRIPTION - ORIENTATION: ORIENTATION: LEFT

## 2022-04-19 ASSESSMENT — PAIN DESCRIPTION - LOCATION: LOCATION: ABDOMEN;BACK

## 2022-04-19 ASSESSMENT — PAIN DESCRIPTION - DESCRIPTORS
DESCRIPTORS: ACHING
DESCRIPTORS: ACHING;CONSTANT

## 2022-04-19 NOTE — PROGRESS NOTES
Patient resting in preop awaiting surgery. Patient verbalizes understanding of procedure. Consent present and verified. H&P present from hospitalist. Perioperative sequence of events explained to patient and he verbalizes understanding of same. Patient also states that one of his 6 daughters is on her way. Handoff report received from Darrell Daugherty RN.

## 2022-04-19 NOTE — PROGRESS NOTES
Shift assessment completed, VSS, alert and oriented. Call light within reach. The care plan and education has been reviewed and mutually agreed upon with the patient. Patient remains free from falls or accidental injury. Room free from clutter. All fall precautions in place. Bed in lowest position with wheels locked and alarm on, call light and belongings within reach, and door open.

## 2022-04-19 NOTE — ANESTHESIA PRE PROCEDURE
Department of Anesthesiology  Preprocedure Note       Name:  Daylin Winston   Age:  57 y.o.  :  1960                                          MRN:  [id number]         Date:  2022      Surgeon: Vernon Rosario):  Donelda Meckel, MD    Procedure: Procedure(s):  CYSTOSCOPY, RIGHT RETROGRADE PYELOGRAM, PLACEMENT OF RIGHT URETERAL STENT    Medications prior to admission:   Prior to Admission medications    Not on File       Current medications:    Current Facility-Administered Medications   Medication Dose Route Frequency Provider Last Rate Last Admin    sodium chloride flush 0.9 % injection 5-40 mL  5-40 mL IntraVENous 2 times per day Darene Carrel, MD   10 mL at 22    sodium chloride flush 0.9 % injection 5-40 mL  5-40 mL IntraVENous PRN Loraine Maradiaga MD        0.9 % sodium chloride infusion   IntraVENous PRN Darene Carrel, MD [address] [place] [address]] enoxaparin (LOVENOX) injection 40 mg  40 mg SubCUTAneous Daily Loraine Maradiaga MD        ondansetron (ZOFRAN-ODT) disintegrating tablet 4 mg  4 mg Oral Q8H PRN Loraine Maradiaga MD        Or    ondansetron (ZOFRAN) injection 4 mg  4 mg IntraVENous Q6H PRN Loraine Maradiaga MD        polyethylene glycol (GLYCOLAX) packet 17 g  17 g Oral Daily PRN Darene Carrel, MD        acetaminophen (TYLENOL) tablet 650 mg  650 mg Oral Q6H PRN Loraine Maradiaga MD        Or   Zannie Cowden acetaminophen (TYLENOL) suppository 650 mg  650 mg Rectal Q6H PRN Loraine Maradiaga MD        morphine (PF) injection 2 mg  2 mg IntraVENous Q4H PRN Darene Carrel, MD   2 mg at 22 0825    oxyCODONE-acetaminophen (PERCOCET) 5-325 MG per tablet 1 tablet  1 tablet Oral Q4H PRN Darene Carrel, MD        0.9 % sodium chloride infusion   IntraVENous Continuous Darene Carrel,  mL/hr at 22 New Bag at 22       Allergies:  No Known Allergies    Problem List:    Patient Active Problem List   Diagnosis Code    JOE (acute kidney injury) ([facility] [address] N17.9       Past Medical History: History reviewed. No pertinent past medical history. Past Surgical History:        Procedure Laterality Date    LITHOTRIPSY         Social History:    Social History     Tobacco Use    Smoking status: Never Smoker    Smokeless tobacco: Never Used   Substance Use Topics    Alcohol use: Not Currently                                Counseling given: Not Answered      Vital Signs (Current):   Vitals:    04/18/22 2313 04/19/22 0400 04/19/22 0745 04/19/22 0845   BP: (!) 144/97 (!) 144/97 133/81    Pulse: 84 79 105    Resp: 18 18 18 20   Temp: 98.6 °F (37 °C) 98.7 °F (37.1 °C) 98.6 °F (37 °C)    TempSrc: Oral Oral Oral    SpO2: 97% 96% 97% 96%   Weight:       Height:                                                  BP Readings from Last 3 Encounters:   04/19/22 133/81       NPO Status:                                                                                 BMI:   Wt Readings from Last 3 Encounters:   04/18/22 201 lb 12.8 oz (91.5 kg)     Body mass index is 25.91 kg/m². CBC:   Lab Results   Component Value Date    WBC 13.6 04/19/2022    RBC 4.94 04/19/2022    HGB 15.3 04/19/2022    HCT 46.5 04/19/2022    MCV 94.2 04/19/2022    RDW 13.9 04/19/2022     04/19/2022       CMP:   Lab Results   Component Value Date     04/19/2022    K 4.5 04/19/2022    K 4.2 04/18/2022     04/19/2022    CO2 23 04/19/2022    BUN 53 04/19/2022    CREATININE 3.7 04/19/2022    GFRAA 20 04/19/2022    AGRATIO 1.2 04/18/2022    LABGLOM 17 04/19/2022    GLUCOSE 106 04/19/2022    PROT 8.3 04/18/2022    CALCIUM 9.5 04/19/2022    BILITOT 1.3 04/18/2022    ALKPHOS 81 04/18/2022    AST 42 04/18/2022    ALT 35 04/18/2022       POC Tests: No results for input(s): POCGLU, POCNA, POCK, POCCL, POCBUN, POCHEMO, POCHCT in the last 72 hours.     Coags: No results found for: PROTIME, INR, APTT    HCG (If Applicable): No results found for: PREGTESTUR, PREGSERUM, HCG, HCGQUANT     ABGs: No results found for: PHART, PO2ART, WSX1AIB, KVN9GBB, BEART, T8YBOBJN     Type & Screen (If Applicable):  No results found for: LABABO, LABRH    Drug/Infectious Status (If Applicable):  No results found for: HIV, HEPCAB    COVID-19 Screening (If Applicable): No results found for: COVID19        Anesthesia Evaluation  Patient summary reviewed and Nursing notes reviewed  Airway: Mallampati: II  TM distance: >3 FB   Neck ROM: full  Mouth opening: > = 3 FB Dental:          Pulmonary:                              Cardiovascular:  Exercise tolerance: good (>4 METS),                     Neuro/Psych:               GI/Hepatic/Renal:             Endo/Other:                     Abdominal:             Vascular: Other Findings:             Anesthesia Plan      general     ASA 2       Induction: intravenous. MIPS: Postoperative opioids intended, Prophylactic antiemetics administered and Postoperative trial extubation. Anesthetic plan and risks discussed with patient. Plan discussed with CRNA.                   Romeo Bravo MD   4/19/2022

## 2022-04-19 NOTE — PROGRESS NOTES
4 Eyes Skin Assessment     NAME:  Roman Zapien OF BIRTH:  1960  MEDICAL RECORD NUMBER:  [id number]    The patient is being assess for  Admission    I agree that 2 RN's have performed a thorough Head to Toe Skin Assessment on the patient. ALL assessment sites listed below have been assessed. Areas assessed by both nurses:    Head, Face, Ears, Shoulders, Back, Chest, Arms, Elbows, Hands, Sacrum. Buttock, Coccyx, Ischium and Legs. Feet and Heels        Does the Patient have a Wound?  No noted wound(s)       Luis Miguel Prevention initiated:  NA   Wound Care Orders initiated:  NA    Pressure Injury (Stage 3,4, Unstageable, DTI, NWPT, and Complex wounds) if present place consult order under [de-identified] NA    New and Established Ostomies if present place consult order under : NA      Nurse 1 eSignature: Electronically signed by Aldo Jean RN on 4/18/22 at 10:44 PM EDT    **SHARE this note so that the co-signing nurse is able to place an eSignature**    Nurse 2 eSignature: Electronically signed by Kanchan Manriquez RN on 4/18/22 at 11:27 PM EDT

## 2022-04-19 NOTE — ANESTHESIA POSTPROCEDURE EVALUATION
Department of Anesthesiology  Postprocedure Note    Patient: Daylin Winston  MRN: [id number]  YOB: 1960  Date of evaluation: 4/19/2022  Time:  12:56 PM     Procedure Summary     Date: 04/19/22 Room / Location: [address] [place] [address]    Anesthesia Start: 3454 Anesthesia Stop: 1520    Procedure: CYSTOSCOPY, HOLMIUM LASER LITHOTRIPSY OF BLADDER STONE (Right ) Diagnosis: (Right Ureteral Calculus)    Surgeons: Donelda Meckel, MD Responsible Provider: Ann Hernandez MD    Anesthesia Type: general ASA Status: 2          Anesthesia Type: general    Ashlyn Phase I: Ashlyn Score: 8    Ashlyn Phase II:      Last vitals: Reviewed and per EMR flowsheets.        Anesthesia Post Evaluation    Patient location during evaluation: PACU  Patient participation: complete - patient participated  Level of consciousness: awake and alert  Pain score: 2  Airway patency: patent  Nausea & Vomiting: no vomiting  Complications: no  Cardiovascular status: blood pressure returned to baseline  Respiratory status: acceptable  Hydration status: euvolemic  Multimodal analgesia pain management approach

## 2022-04-19 NOTE — PROGRESS NOTES
Admitted patient to room 4458 from ED with dx: renal stone. Arrived to unit via stretcher with all belongings. Daughter (April) at bedside. Vitals:    04/18/22 2045   BP: (!) 147/96   Pulse: 95   Resp: 18   Temp: 98.6 °F (37 °C)   SpO2: 94%       Isolation:  No active isolations     GEN: Patient is alert & oriented, speech clear and appropriate. Pain: Denies pain. Pain/Discomfort is being managed with non pharmacological interventions (rest & positioning) and PRN analgesics per MD orders (See MAR). Patient is able to express and rate pain using numerical scale. IV:    IV site clean dry and intact without redness or pain. CV: HRRRR. On Telemetry:SR with PVC / Afib. No edema. Palpable pulses. No home medication. Lungs: Respirations easy, unlabored without cough. On Room Air, denies SOB. Lungs clear. Encouraged C&DB. GI/: No complaints of nausea/vomiting/diarrhea. Tolerating diet. Abdomen soft, non tender, with bowel sounds. Continent of bowel and bladder. Skin: Warm and dry. Mobility: Up independently as tolerated. Safety: Oriented to room, call light, tv, phone and dietary services. Bed in lowest position and locked. Non slip socks on. ID bracelet on and correct per patient verbally reporting name and date of birth. Plan of care and safety measures reviewed with the patient. Call light and needed items in reach. Disposition: Urology consulted for 15mm stone. NPO after midnight for possible surgical intervention. He lives at home alone with family support.

## 2022-04-19 NOTE — PLAN OF CARE
Problem: Pain:  Goal: Pain level will decrease  Description: Pain level will decrease  Outcome: Ongoing     Problem: Urinary Elimination:  Goal: Ability to achieve a balanced intake and output will improve  Description: Ability to achieve a balanced intake and output will improve  Outcome: Ongoing

## 2022-04-19 NOTE — PROGRESS NOTES
[facility] PROGRESS NOTE    4/19/2022 3:37 PM        Name: Brie Beltran . Admitted: 4/18/2022  Primary Care Provider: No primary care provider on file. (Tel: None)                        Subjective:  . No acute events overnight. Resting well. Pain control. Diet ok. Labs reviewed  Denies any chest pain sob. Reviewed interval ancillary notes    Current Medications  tamsulosin (FLOMAX) capsule 0.4 mg, Daily  sodium chloride flush 0.9 % injection 5-40 mL, 2 times per day  sodium chloride flush 0.9 % injection 5-40 mL, PRN  0.9 % sodium chloride infusion, PRN  [Held by provider] enoxaparin (LOVENOX) injection 40 mg, Daily  ondansetron (ZOFRAN-ODT) disintegrating tablet 4 mg, Q8H PRN   Or  ondansetron (ZOFRAN) injection 4 mg, Q6H PRN  polyethylene glycol (GLYCOLAX) packet 17 g, Daily PRN  acetaminophen (TYLENOL) tablet 650 mg, Q6H PRN   Or  acetaminophen (TYLENOL) suppository 650 mg, Q6H PRN  morphine (PF) injection 2 mg, Q4H PRN  oxyCODONE-acetaminophen (PERCOCET) 5-325 MG per tablet 1 tablet, Q4H PRN  0.9 % sodium chloride infusion, Continuous        Objective:  BP (!) 152/83   Pulse 85   Temp 98.2 °F (36.8 °C) (Oral)   Resp 16   Ht 6' 2\" (1.88 m)   Wt 201 lb 12.8 oz (91.5 kg)   SpO2 97%   BMI 25.91 kg/m²     Intake/Output Summary (Last 24 hours) at 4/19/2022 1537  Last data filed at 4/19/2022 1218  Gross per 24 hour   Intake 500 ml   Output --   Net 500 ml      Wt Readings from Last 3 Encounters:   04/18/22 201 lb 12.8 oz (91.5 kg)       General appearance:  Appears comfortable  Eyes: Sclera clear. Pupils equal.  ENT: Moist oral mucosa. Trachea midline, no adenopathy. Cardiovascular: Regular rhythm, normal S1, S2. No murmur. No edema in lower extremities  Respiratory: Not using accessory muscles. Good inspiratory effort.  Clear to auscultation bilaterally, no wheeze or crackles. GI: Abdomen soft, no tenderness, not distended, normal bowel sounds  Musculoskeletal: No cyanosis in digits, neck supple  Neurology: CN 2-12 grossly intact. No speech or motor deficits  Psych: Normal affect. Alert and oriented in time, place and person  Skin: Warm, dry, normal turgor    Labs and Tests:  CBC:   Recent Labs     04/18/22  1542 04/19/22  0742   WBC 20.1* 13.6*   HGB 16.8 15.3    214     BMP:    Recent Labs     04/18/22  1542 04/19/22  0742   * 137   K 4.2 4.5   CL 96* 100   CO2 22 23   BUN 34* 53*   CREATININE 2.7* 3.7*   GLUCOSE 123* 106*     Hepatic:   Recent Labs     04/18/22  1542   AST 42*   ALT 35   BILITOT 1.3*   ALKPHOS 81       Discussed care with patient             Problem List  Principal Problem:    JOE (acute kidney injury) ([address]  Resolved Problems:    * No resolved hospital problems. *       Assessment & Plan:   1. Obstructing renal stone  = 15 mm stone  -Status post cystoscopy  -Continue pain control. Acute renal failure  0 slightly worse today at 3.7 from 2.7  -Monitor closely. No recent labs to compare baseline has had normal kidney function otherwise per patient      Diet: ADULT DIET;  Regular  Code:Full Code  DVT PPX lovenox       David Newman MD   4/19/2022 3:37 PM

## 2022-04-19 NOTE — CARE COORDINATION
Discharge Planning Note:    Chart reviewed and it appears that patient has minimal needs for discharge at this time. Discussed with patient and requested that case management be notified if discharge needs are identified.     - Current discharge plan is for the patient to return home with no needs. Case management will continue to follow progress and update discharge plan as needed.     Risk of Readmission Score: 10%    GENESIS Bravo RN    [facility]  Phone: [phone]

## 2022-04-19 NOTE — CONSULTS
Urology Consult Note  [facility]     Patient: Eileen Plummer MRN: [id number]  Room/Bed: [facility]4179/5555-01   YOB: 1960  Age/Sex: 57 y.o.male  Admission Date: 4/18/2022     Date of Service:  4/19/2022    Consulting Provider: IKM Jasso CNP  Admitting/Requesting Physician: Luis Wharton MD  Primary Care Physician: No primary care provider on file. Reason for Consult: Ureteral Stone, RIGHT    ASSESSMENT/PLAN     56 yo male presents with right renal colic radiating to the right groin, onset x3 days ago, associated with emesis and oliguria. CT a/p in the ED demonstrating a constellation of  findings including a 15mm RIGHT UVJ stone, moderate distension of the urinary bladder with a moderately enlarged, minimally calcified prostate. There are multiple cystic masses in both kidneys with a 4cm hyperdense mass of the upper pole of the left kidney. Cr is 3.7, up from 2.7 yesterday. No obvious established baseline renal function. Leukocytosis improved to 13. 6. UA is negative for infection. Afebrile. Exam- bladder feels distended. CT a/p WO - 04/18/2022  Impression   Mild-to-moderate hydronephrosis and mild hydroureter bilaterally seen down to   the bladder which is more prominent on the right. Keyanna Mercedes is a 15 mm stone   lodged in the ureteral vesicle junction on the right.       Moderate distention of the urinary bladder which may represent an element of   bladder outlet obstruction with moderate prostatic enlargement.  Recommend   urological follow-up.       Questionable diffuse mild ileus.       Status post cholecystectomy with calcifications throughout the pancreas in   keeping with chronic calcific pancreatitis with no obvious active   inflammation or mass.       Moderate adrenal thickening on the left which could be due to hyperplasia.    Recommend correlating with lab values.       Multiple cystic masses in both kidneys with a 4 cm hyperdense mass upper pole   left kidney which probably represents a hemorrhagic cyst.  Recommend   ultrasound correlation. Recommendations:  Keep NPO for cystoscopy, right ureteral stent insertion. The decision to proceed to the OR, and the surgery itself, was made within 24 hours of this consultation. Start Flomax  Obtain PVR before going to the OR today  -Likely will need smith insertion in the OR  PSA  KAITLIN for bl cysts on kidneys L>R- left 4cm hyperdense cyst  Hold AC for now  Will need follow up in the office for for the above issues          All patient questions were answered. He understands the plan as listed above. HISTORY     Chief Complaint:   Chief Complaint   Patient presents with    Flank Pain     Left flank pain radiating to the groin with difficulty urination for two days; fevers, vomitting & has not urinated in 5 hours. Hx of kidney stones with lithotripsy in past.         History of Present Illness: Fidelia Pace is a 57 y.o. male with ureteral stone. Onset of symptoms was several days ago with improving course since that time. Symptoms are aggravated by decreased fluid intake. Symptoms improved with fluids and pain medications. Associated symptoms include nausea and flank pain. Patient also reports high blood pressure . He has tried the following treatments: flomax, surgery today. Past Medical History:  He has no past medical history on file. Hospital Problem List:  Principal Problem:    JOE (acute kidney injury) ([facility] [address]  Resolved Problems:    * No resolved hospital problems. *      Past Surgical History:  He has a past surgical history that includes Lithotripsy. Social History:  He reports that he has never smoked. He has never used smokeless tobacco. He reports previous alcohol use. He reports previous drug use. Family History:  family history is not on file.     Allergies:  No Known Allergies    Medications:  Scheduled Meds:   sodium chloride flush  5-40 mL IntraVENous 2 times per day    sodium chloride flush  5-40 mL IntraVENous 2 times per day    [START ON 4/20/2022] enoxaparin  40 mg SubCUTAneous Daily     Continuous Infusions:   sodium chloride      sodium chloride      sodium chloride 150 mL/hr at 04/18/22 2213     PRN Meds:sodium chloride flush, sodium chloride, meperidine, HYDROmorphone, HYDROmorphone, ondansetron, diphenhydrAMINE, sodium chloride flush, sodium chloride, ondansetron **OR** ondansetron, polyethylene glycol, acetaminophen **OR** acetaminophen, morphine, oxyCODONE-acetaminophen    Review of Systems:  Pertinent positives/negatives reviewed in HPI. All other systems reviewed and negative, unless noted below. Constitutional: Negative  Genitourinary: see HPI  HEENT: Negative   Cardiovascular: Negative   Respiratory: Negative   Gastrointestinal: Negative   Musculoskeletal: Negative   Neurological: Negative   Psychiatric: Negative   Integumentary: Negative     PHYSICAL EXAM     Vitals:    04/19/22 0845   BP:    Pulse:    Resp: 20   Temp:    SpO2: 96%     CONSTITUTIONAL: The patient is well nourished/developed, with no distress noted. NEUROLOGICAL/PSYCHIATRIC: Oriented to place and time, normal affected noted. NECK: The neck is symmetrical and supple, with no masses noted. CARDIOVASCULAR: Regular rate and rhythm, no evidence of swelling noted. RESPIRATORY: Normal respiratory effort with no wheezing noted. ABDOMEN: Abdomen soft, non-tender, non-distended. No enlarged liver or spleen. No hernias noted. Stool occult blood not indicated. SKIN: Skin appears normal.  LYMPHATICS: No adenopathy noted. CVA: No CVA tenderness bilaterally. GENITOURINARY: The penis is without rash or lesions and meatus with expected size and location. The scrotum appears normal. Bilateral testicles appears to be of normal size and location. No masses or tenderness noted of testicles or epididymis.      Ins/Outs:  No intake or output data in the 24 hours ending 04/19/22 0933    LABS     CBC   Lab Results Component Value Date    WBC 13.6 04/19/2022    RBC 4.94 04/19/2022    HGB 15.3 04/19/2022    HCT 46.5 04/19/2022    MCV 94.2 04/19/2022    MCH 31.0 04/19/2022    MCHC 32.9 04/19/2022    RDW 13.9 04/19/2022     04/19/2022    MPV 7.5 04/19/2022     BMP   Lab Results   Component Value Date     04/19/2022    K 4.5 04/19/2022    K 4.2 04/18/2022     04/19/2022    CO2 23 04/19/2022    BUN 53 04/19/2022    CREATININE 3.7 04/19/2022    GLUCOSE 106 04/19/2022    CALCIUM 9.5 04/19/2022     Urinalysis:   Lab Results   Component Value Date    COLORU Yellow 04/18/2022    GLUCOSEU 100 04/18/2022    BLOODU SMALL 04/18/2022    NITRU Negative 04/18/2022    LEUKOCYTESUR Negative 04/18/2022     Urine culture: No results for input(s): Dorthula Cobia in the last 72 hours. PSA: No results found for: PSA      IMAGING     CT ABDOMEN PELVIS WO CONTRAST Additional Contrast? None    Result Date: 4/18/2022  EXAMINATION: CT OF THE ABDOMEN AND PELVIS WITHOUT CONTRAST 4/18/2022 4:16 pm TECHNIQUE: CT of the abdomen and pelvis was performed without the administration of intravenous contrast. Multiplanar reformatted images are provided for review. Dose modulation, iterative reconstruction, and/or weight based adjustment of the mA/kV was utilized to reduce the radiation dose to as low as reasonably achievable. COMPARISON: None. HISTORY: ORDERING SYSTEM PROVIDED HISTORY: left flank pain r/o stone or constipation TECHNOLOGIST PROVIDED HISTORY: Reason for exam:->left flank pain r/o stone or constipation Additional Contrast?->None Decision Support Exception - unselect if not a suspected or confirmed emergency medical condition->Emergency Medical Condition (MA) Reason for Exam: Flank Pain (Left flank pain radiating to the groin with difficulty urination for two days; fevers, vomitting & has not urinated in 5 hours. Hx of kidney stones with lithotripsy in past.  ) FINDINGS: Lower Chest: The lung bases are clear. Organs:    The liver is mildly enlarged with hypertrophy of the caudate and left lobes. The gallbladder has been removed. The bile ducts are normal. There are numerous punctate calcifications scattered throughout the pancreas with no obvious pancreatic mass or active inflammatory changes. The spleen is grossly normal size. There is moderate thickening of the left adrenal gland. The right adrenals unremarkable. There is moderate stranding around both kidneys with mild to moderate hydronephrosis bilaterally. There is mild hydroureter seen down to the bladder which is more prominent on the right. There are small cysts along both kidneys inferiorly and there is a 4.2 cm hyperdense mass along the upper pole left kidney posteriorly. There is a 2 mm stone lower pole right kidney. There is mild stranding around both kidneys extending inferiorly. The bladder is moderately distended. There is a 15 x 13 mm irregular calcification lodged in the ureteral vesicle junction on the right. GI/Bowel: The appendix is normal.  There are multiple loops of mildly dilated large and small bowel throughout the abdomen with small air-fluid levels throughout. There is no obvious bowel wall thickening. Pelvis: The prostate gland is moderately enlarged and partially calcified. There is no adenopathy or ascites. Peritoneum/Retroperitoneum:   There is mild calcified plaque throughout the aorta which is nondilated with no aneurysm and no retroperitoneal mass or adenopathy. Bones/Soft Tissues: The bones are intact. No aggressive osseous lesion is seen. Mild-to-moderate hydronephrosis and mild hydroureter bilaterally seen down to the bladder which is more prominent on the right. There is a 15 mm stone lodged in the ureteral vesicle junction on the right. Moderate distention of the urinary bladder which may represent an element of bladder outlet obstruction with moderate prostatic enlargement. Recommend urological follow-up.  Questionable diffuse mild ileus. Status post cholecystectomy with calcifications throughout the pancreas in keeping with chronic calcific pancreatitis with no obvious active inflammation or mass. Moderate adrenal thickening on the left which could be due to hyperplasia. Recommend correlating with lab values. Multiple cystic masses in both kidneys with a 4 cm hyperdense mass upper pole left kidney which probably represents a hemorrhagic cyst.  Recommend ultrasound correlation.             Electronically signed by: KIM Archer CNP  4/19/2022   [facility]  Office Contact: [phone]

## 2022-04-19 NOTE — CONSULTS
MD Jhonny Brand MD Rosabel Fraction, MD                Office: [phone]                      Fax: [phone]               [email or web address]                   Nephrology consult received by Dr Dee Connolly (directly). -- full consult report will follow. Thank you for allowing me to participate in this patient's care. Please do not hesitate to contact us anytime. We will follow along with you. Ivania Villeda MD  [facility] [address] [place] [address]   [phone] or [address]    =====================================================        Principal Problem:    JOE (acute kidney injury) ([address]  Resolved Problems:    * No resolved hospital problems. *          Labs reviewed by me     CBC: Recent Labs     04/18/22  1542   WBC 20.1*   HGB 16.8   HCT 50.6   MCV 92.5        BMP:   Recent Labs     04/18/22  1542   *   K 4.2   CL 96*   CO2 22   BUN 34*   CREATININE 2.7*     Magnesium: No results found for: MG    Arterial Blood Gasses  No results for input(s): PH, PCO2, PO2 in the last 72 hours. Invalid input(s): [place] Luis Felipe    UA:  Recent Labs     04/18/22  1540   COLORU Yellow   PHUR 6.0   WBCUA 5   RBCUA 52*   CLARITYU Clear   SPECGRAV 1.020   LEUKOCYTESUR Negative   UROBILINOGEN 0.2   BILIRUBINUR Negative   BLOODU SMALL*   GLUCOSEU 100*       LIVER PROFILE:   Recent Labs     04/18/22  1542   AST 42*   ALT 35   LIPASE 372.0*   BILITOT 1.3*   ALKPHOS 81     PT/INR:  No results found for: PROTIME, INR  PTT:  No results found for: APTT  PANDA:  No results found for: ANATITER, PANDA        RADIOLOGY:    CT ABDOMEN PELVIS WO CONTRAST Additional Contrast? None    Result Date: 4/18/2022  EXAMINATION: CT OF THE ABDOMEN AND PELVIS WITHOUT CONTRAST 4/18/2022 4:16 pm TECHNIQUE: CT of the abdomen and pelvis was performed without the administration of intravenous contrast. Multiplanar reformatted images are provided for review.  Dose modulation, iterative reconstruction, and/or weight based adjustment of the mA/kV was utilized to reduce the radiation dose to as low as reasonably achievable. COMPARISON: None. HISTORY: ORDERING SYSTEM PROVIDED HISTORY: left flank pain r/o stone or constipation TECHNOLOGIST PROVIDED HISTORY: Reason for exam:->left flank pain r/o stone or constipation Additional Contrast?->None Decision Support Exception - unselect if not a suspected or confirmed emergency medical condition->Emergency Medical Condition (MA) Reason for Exam: Flank Pain (Left flank pain radiating to the groin with difficulty urination for two days; fevers, vomitting & has not urinated in 5 hours. Hx of kidney stones with lithotripsy in past.  ) FINDINGS: Lower Chest: The lung bases are clear. Organs: The liver is mildly enlarged with hypertrophy of the caudate and left lobes. The gallbladder has been removed. The bile ducts are normal. There are numerous punctate calcifications scattered throughout the pancreas with no obvious pancreatic mass or active inflammatory changes. The spleen is grossly normal size. There is moderate thickening of the left adrenal gland. The right adrenals unremarkable. There is moderate stranding around both kidneys with mild to moderate hydronephrosis bilaterally. There is mild hydroureter seen down to the bladder which is more prominent on the right. There are small cysts along both kidneys inferiorly and there is a 4.2 cm hyperdense mass along the upper pole left kidney posteriorly. There is a 2 mm stone lower pole right kidney. There is mild stranding around both kidneys extending inferiorly. The bladder is moderately distended. There is a 15 x 13 mm irregular calcification lodged in the ureteral vesicle junction on the right. GI/Bowel: The appendix is normal.  There are multiple loops of mildly dilated large and small bowel throughout the abdomen with small air-fluid levels throughout.   There is no obvious bowel wall thickening. Pelvis: The prostate gland is moderately enlarged and partially calcified. There is no adenopathy or ascites. Peritoneum/Retroperitoneum:   There is mild calcified plaque throughout the aorta which is nondilated with no aneurysm and no retroperitoneal mass or adenopathy. Bones/Soft Tissues: The bones are intact. No aggressive osseous lesion is seen. Mild-to-moderate hydronephrosis and mild hydroureter bilaterally seen down to the bladder which is more prominent on the right. There is a 15 mm stone lodged in the ureteral vesicle junction on the right. Moderate distention of the urinary bladder which may represent an element of bladder outlet obstruction with moderate prostatic enlargement. Recommend urological follow-up. Questionable diffuse mild ileus. Status post cholecystectomy with calcifications throughout the pancreas in keeping with chronic calcific pancreatitis with no obvious active inflammation or mass. Moderate adrenal thickening on the left which could be due to hyperplasia. Recommend correlating with lab values. Multiple cystic masses in both kidneys with a 4 cm hyperdense mass upper pole left kidney which probably represents a hemorrhagic cyst.  Recommend ultrasound correlation. Imaging Results.   Chest X Ray reviwed by me

## 2022-04-19 NOTE — CONSULTS
MD Lebron Guevara MD Butler Roller, MD               Office: [phone]                      Fax: [phone]             [facility]                   NEPHROLOGY INITIAL CONSULT NOTE:     PATIENT NAME: Jose Berman  : 1960  MRN: [id number]  REASON FOR CONSULT: For evaluation and management of Acute Kidney Injury. (My recommendations will be communicated by way of shared medical record.)  Referring - Cheri Lopez MD  (contacted directly)       RECOMMENDATIONS:   -NS IVF higher rate 150 cc/h  -Beltrán insertion needed concern for TIDWELL  -Urology follow-up, stone work-up  -Avoid NSAIDs like ketorolac, low-dose morphine for pain control  - at higher risk for decompensation, needing closer monitoring.    - assistance for PCP referral and financial aid     D/C plan from renal stand point:  -Follow JOE course, expect 2-3 days    Discussed with patient, team - hospitalist, urology        IMPRESSION:       Admitted for:  JOE (acute kidney injury) ([facility] [address] [N17.9]      JOE (no known hx of CKD) - no labs since 2015  - BL Scr- 1.1, eGFR >60 as in 2015 ---> 2.7 on admission  -: Etiology of JOE - pre-renal w/ n/v, obstructive uropathy  - UA results reviewed showing trace ketones, small blood, 100 protein, hyaline casts, mild RBCs. -Explained by current presentation,   -No need for any serology work-up currently    Obstructive uropathy  15 mm stone lodged in the ureteral vesicle junction on the right. Mild-to-moderate hydronephrosis   mild hydroureter bilaterally - more prominent on the right. Moderate distention of the urinary bladder-an element of bladder outlet obstruction with moderate prostatic enlargement.         Associated problems:   : Na: hyponatremia - mild  - Azotemia: pre-renal  - Electrolytes: K: WNL  Hypochloremia   - Acid-Base: WNL  - Anemia: WNL    Other major problems: Management per primary and other consulting teams.   //  Jean Shin Problems           Last Modified POA    * (Principal) JOE (acute kidney injury) ([facility] [address] 4/18/2022 Yes        : other supportive care :   - Check daily renal function panel with electrolytes-phosphorus  - Strict monitoring of I/Os, daily weight  - Renal feeds/diet  - Current medications reviewed. - Nephrotoxic medications have been discontinued. - Dose adjusted and appropriate. - Dose meds for eGFR <15 mL/min/1.73m2 during JOE    - Avoid heavy opioids due to renal failure - may use very low dose dilaudid / fentanyl with close monitoring of CNS and respiratory depression. Please refer to the orders. High Complexity. Multiple complex problems. Discussed with patient and treatment team-    Time spent > 30 (~35) minutes. Thank you for allowing me to participate in this patient's care. Please do not hesitate to contact me anytime. We will follow along with you. Emma Shell MD,  [facility] [address] [place] [address] [place]: [phone] or [address] [employer]  Fax: [phone]        =======================================================================================   =======================================================================================      CHIEF COMPLAINT:   Chief Complaint   Patient presents with    Flank Pain     Left flank pain radiating to the groin with difficulty urination for two days; fevers, vomitting & has not urinated in 5 hours. Hx of kidney stones with lithotripsy in past.       History Obtained From:  patient + treatment team + Electronic Medical Records    HPI: Mr. Ralph Carey is a 57 y.o. male with significant past medical history as below: History reviewed. No pertinent past medical history. Presents with Flank Pain (Left flank pain radiating to the groin with difficulty urination for two days; fevers, vomitting & has not urinated in 5 hours.   Hx of kidney stones with lithotripsy in past.  )    Admitted with JOE (acute kidney injury) ([facility] [address] [N17.9] Found to have Matti with obstructive uropathy, so we are called for that. No current active complaints. Patient denied chest pain / dizziness/lightheadedness/syncope/ SOB / leg edema. *  Regarding: MATTI   · Duration: acute   · Location: kidneys  · Severity: Severe    · Timing: continous  · Context (ex: related to condition):   , refer to my assessment / impression. · Modifying factors (ex: medications, interventions):   , refer to my plan / recommendation. · Associated signs & symptoms (ex: edema, SOB): As mentioned above in CC and HPI      Past medical, Surgical, Social, Family medical history reviewed by me. PAST MEDICAL HISTORY: History reviewed. No pertinent past medical history. PAST SURGICAL HISTORY:   Past Surgical History:   Procedure Laterality Date    LITHOTRIPSY       FAMILY HISTORY: History reviewed. No pertinent family history. SOCIAL HISTORY:   Social History     Socioeconomic History    Marital status:      Spouse name: None    Number of children: 7    Years of education: None    Highest education level: None   Occupational History    None   Tobacco Use    Smoking status: Never Smoker    Smokeless tobacco: Never Used   Vaping Use    Vaping Use: Never used   Substance and Sexual Activity    Alcohol use: Not Currently    Drug use: Not Currently    Sexual activity: None   Other Topics Concern    None   Social History Narrative    None     Social Determinants of Health     Financial Resource Strain:     Difficulty of Paying Living Expenses: Not on file   Food Insecurity:     Worried About Running Out of Food in the Last Year: Not on file    Lucy of Food in the Last Year: Not on file   Transportation Needs:     Lack of Transportation (Medical): Not on file    Lack of Transportation (Non-Medical):  Not on file   Physical Activity:     Days of Exercise per Week: Not on file    Minutes of Exercise per Session: Not on file   Stress:     Feeling of Stress : Not on file Social Connections:     Frequency of Communication with Friends and Family: Not on file    Frequency of Social Gatherings with Friends and Family: Not on file    Attends Hinduism Services: Not on file    Active Member of Clubs or Organizations: Not on file    Attends Club or Organization Meetings: Not on file    Marital Status: Not on file   Intimate Partner Violence:     Fear of Current or Ex-Partner: Not on file    Emotionally Abused: Not on file    Physically Abused: Not on file    Sexually Abused: Not on file   Housing Stability:     Unable to Pay for Housing in the Last Year: Not on file    Number of [place] in the Last Year: Not on file    Unstable Housing in the Last Year: Not on file          MEDICATIONS: reviewed by me. Medications Prior to Admission:  No current facility-administered medications on file prior to encounter. No current outpatient medications on file prior to encounter. Current Facility-Administered Medications:     morphine injection 4 mg, 4 mg, IntraVENous, Q30 Min PRN, Areli Peñaloza, APRN - CNP, 4 mg at 04/18/22 1947  No current outpatient medications on file. Allergies reviewed by me: Patient has no known allergies. REVIEW OF SYSTEMS:  As mentioned in chief complaint and HPI , Subjective   All other 10-point review of systems: negative.      =======================================================================================     PHYSICAL EXAM:  Recent vital signs and recent I/Os reviewed by me.      Wt Readings from Last 3 Encounters:   04/18/22 204 lb (92.5 kg)     BP Readings from Last 3 Encounters:   04/18/22 (!) 144/88     Patient Vitals for the past 24 hrs:   BP Temp Pulse Resp SpO2 Weight   04/18/22 2000 (!) 144/88 -- 94 18 97 % --   04/18/22 1900 (!) 135/111 -- 110 16 95 % --   04/18/22 1822 (!) 141/97 -- 120 -- 94 % --   04/18/22 1526 (!) 158/105 98.5 °F (36.9 °C) 125 22 96 % 204 lb (92.5 kg)     No intake or output data in the 24 hours ending 04/18/22 2034      Physical Exam  Vitals reviewed. Constitutional:       General: He is not in acute distress. Appearance: Normal appearance. HENT:      Head: Normocephalic and atraumatic. Right Ear: External ear normal.      Left Ear: External ear normal.      Nose: Nose normal.      Mouth/Throat:      Mouth: Mucous membranes are moist. Mucous membranes are not dry. Eyes:      General: No scleral icterus. Conjunctiva/sclera: Conjunctivae normal.   Neck:      Vascular: No JVD. Cardiovascular:      Rate and Rhythm: Normal rate and regular rhythm. Heart sounds: S1 normal and S2 normal.   Pulmonary:      Effort: Pulmonary effort is normal. No respiratory distress. Breath sounds: Normal breath sounds. Abdominal:      General: Bowel sounds are normal. There is no distension. Musculoskeletal:         General: No swelling or deformity. Cervical back: Normal range of motion and neck supple. Skin:     General: Skin is dry. Coloration: Skin is not jaundiced. Neurological:      Mental Status: He is alert and oriented to person, place, and time. Mental status is at baseline. Psychiatric:         Mood and Affect: Mood normal.         Behavior: Behavior normal.             =======================================================================================     DATA:  Diagnostic tests reviewed by me for today's visit:   (AS NEEDED FOR MY EVALUATION AND MANAGEMENT). Recent Labs     04/18/22  1542   WBC 20.1*   HCT 50.6        Iron Saturation:  No components found for: PERCENTFE  FERRITIN:  No results found for: FERRITIN  IRON:  No results found for: IRON  TIBC:  No results found for: TIBC    Recent Labs     04/18/22  1542   *   K 4.2   CL 96*   CO2 22   BUN 34*   CREATININE 2.7*     Recent Labs     04/18/22  1542   CALCIUM 10.4     No results for input(s): PH, PCO2, PO2 in the last 72 hours.     Invalid input(s): D4SAQALYFRES, INSPIREDO2    ABG: No results found for: PH, PCO2, PO2, HCO3, BE, THGB, TCO2, O2SAT  VBG:  No results found for: PHVEN, ZXV9SBM, BEVEN, C6JJISQW    LDH:  No results found for: LDH  Uric Acid:  No results found for: LABURIC, URICACID    PT/INR:  No results found for: PROTIME, INR  Warfarin PT/INR:  No components found for: PTPATWAR, PTINRWAR  PTT:  No results found for: APTT, PTT[APTT}  Last 3 Troponin:  No results found for: TROPONINI    U/A:    Lab Results   Component Value Date    COLORU Yellow 04/18/2022    PROTEINU 100 04/18/2022    PHUR 6.0 04/18/2022    WBCUA 5 04/18/2022    RBCUA 52 04/18/2022    CLARITYU Clear 04/18/2022    SPECGRAV 1.020 04/18/2022    LEUKOCYTESUR Negative 04/18/2022    UROBILINOGEN 0.2 04/18/2022    BILIRUBINUR Negative 04/18/2022    BLOODU SMALL 04/18/2022    GLUCOSEU 100 04/18/2022     Microalbumen/Creatinine ratio:  No components found for: RUCREAT  24 Hour Urine for Protein:  No components found for: RAWUPRO, UHRS3, FUAY80HB, UTV3  24 Hour Urine for Creatinine Clearance:  No components found for: CREAT4, UHRS10, UTV10  Urine Toxicology:  No components found for: IAMMENTA, IBARBIT, IBENZO, ICOCAINE, IMARTHC, IOPIATES, IPHENCYC    HgBA1c:  No results found for: LABA1C  RPR:  No results found for: RPR  HIV:  No results found for: HIV  PANDA:  No results found for: ANATITER, PANDA  RF:  No results found for: RF  DSDNA:  No components found for: DNA  AMYLASE:  No results found for: AMYLASE  LIPASE:    Lab Results   Component Value Date    LIPASE 372.0 04/18/2022     Fibrinogen Level:  No components found for: FIB       BELOW MENTIONED RADIOLOGY STUDY RESULTS BY ME (AS NEEDED FOR MY EVALUATION AND MANAGEMENT).      CT ABDOMEN PELVIS WO CONTRAST Additional Contrast? None    Result Date: 4/18/2022  EXAMINATION: CT OF THE ABDOMEN AND PELVIS WITHOUT CONTRAST 4/18/2022 4:16 pm TECHNIQUE: CT of the abdomen and pelvis was performed without the administration of intravenous contrast. Multiplanar reformatted images are provided for review. Dose modulation, iterative reconstruction, and/or weight based adjustment of the mA/kV was utilized to reduce the radiation dose to as low as reasonably achievable. COMPARISON: None. HISTORY: ORDERING SYSTEM PROVIDED HISTORY: left flank pain r/o stone or constipation TECHNOLOGIST PROVIDED HISTORY: Reason for exam:->left flank pain r/o stone or constipation Additional Contrast?->None Decision Support Exception - unselect if not a suspected or confirmed emergency medical condition->Emergency Medical Condition (MA) Reason for Exam: Flank Pain (Left flank pain radiating to the groin with difficulty urination for two days; fevers, vomitting & has not urinated in 5 hours. Hx of kidney stones with lithotripsy in past.  ) FINDINGS: Lower Chest: The lung bases are clear. Organs: The liver is mildly enlarged with hypertrophy of the caudate and left lobes. The gallbladder has been removed. The bile ducts are normal. There are numerous punctate calcifications scattered throughout the pancreas with no obvious pancreatic mass or active inflammatory changes. The spleen is grossly normal size. There is moderate thickening of the left adrenal gland. The right adrenals unremarkable. There is moderate stranding around both kidneys with mild to moderate hydronephrosis bilaterally. There is mild hydroureter seen down to the bladder which is more prominent on the right. There are small cysts along both kidneys inferiorly and there is a 4.2 cm hyperdense mass along the upper pole left kidney posteriorly. There is a 2 mm stone lower pole right kidney. There is mild stranding around both kidneys extending inferiorly. The bladder is moderately distended. There is a 15 x 13 mm irregular calcification lodged in the ureteral vesicle junction on the right. GI/Bowel:    The appendix is normal.  There are multiple loops of mildly dilated large and small bowel throughout the abdomen with small air-fluid levels throughout. There is no obvious bowel wall thickening. Pelvis: The prostate gland is moderately enlarged and partially calcified. There is no adenopathy or ascites. Peritoneum/Retroperitoneum:   There is mild calcified plaque throughout the aorta which is nondilated with no aneurysm and no retroperitoneal mass or adenopathy. Bones/Soft Tissues: The bones are intact. No aggressive osseous lesion is seen. Mild-to-moderate hydronephrosis and mild hydroureter bilaterally seen down to the bladder which is more prominent on the right. There is a 15 mm stone lodged in the ureteral vesicle junction on the right. Moderate distention of the urinary bladder which may represent an element of bladder outlet obstruction with moderate prostatic enlargement. Recommend urological follow-up. Questionable diffuse mild ileus. Status post cholecystectomy with calcifications throughout the pancreas in keeping with chronic calcific pancreatitis with no obvious active inflammation or mass. Moderate adrenal thickening on the left which could be due to hyperplasia. Recommend correlating with lab values. Multiple cystic masses in both kidneys with a 4 cm hyperdense mass upper pole left kidney which probably represents a hemorrhagic cyst.  Recommend ultrasound correlation.

## 2022-04-19 NOTE — PLAN OF CARE
Problem: Pain:  Goal: Pain level will decrease  Description: Pain level will decrease  4/19/2022 0827 by Sarah Mayberry RN  Outcome: Ongoing     Problem: Pain:  Goal: Control of acute pain  Description: Control of acute pain  Outcome: Ongoing     Problem: Pain:  Goal: Control of chronic pain  Description: Control of chronic pain  Outcome: Ongoing     Problem: Urinary Elimination:  Goal: Ability to achieve a balanced intake and output will improve  Description: Ability to achieve a balanced intake and output will improve  4/19/2022 0827 by Sarah Mayberry RN  Outcome: Ongoing

## 2022-04-19 NOTE — OP NOTE
Urology Operative Report  [facility]    Provider: Janes Colindres MD MD Patient ID:  Admission Date: 2022 Name: Champ Chambers  OR Date: 2022  MRN: [id number]   Patient Location: OR/NONE : 1960  Attending: Aamir Bustos MD Date of Service: 2022  PCP: No primary care provider on file. Date of Operation: 2022    Preoperative Diagnosis: Bladder Calculus vs ureteral calculus    Postoperative Diagnosis: bladder calculus    Procedure:    1. Cystoscopy with cystolithalopaxy    Surgeon:   Janes Colindres MD, MD    Anesthesia: General endotracheal anesthesia    Indications: Champ Chambers is a 57 y.o. male who presents for the above named surgery. Informed consent was obtained and the risks, benefits, and details of the procedure were explained to the patient who elected to proceed. Details of Procedure: The patient was brought to the operating room and placed in the supine position on the operating room table. SCDs were placed on the lower extremities. Following induction of anesthesia the patient was positioned in a lithotomy position, all pressure points were padded, and the genitals were prepped and draped in the usual sterile fashion. A routine timeout was performed, confirming the patient, procedure, site, risk of fire, patient allergies and confirming that preoperative antibiotics had been administered prior to beginning. A 21 fr rigid cystoscope was advanced into the bladder. There was a stone in the bladder as expected based on imaging. Using a laser fiber the stone was thoroughly fragmented and the evacuated using irrigation. Once complete the bladder was inspected and the ureteral orifices were visualized. There was no bladder trauma noted. The bladder was left full and the scope was removed. At the end of the procedure all counts were correct.  The patient tolerated the procedure well and was transported to the PACU in stable condition. Findings: successful treatment of the 15 bladder stone. I do think he is having urinary retention with overflow, which is why I placed the Beltrán catheter in him. PSA pending for the AM. He would be a candidate for TURP. Estimated Blood Loss: minimal, <5ml                  Drains: 16 fr coude Beltrán          Specimens: Bladder calculus for stone analysis    Complications: none apparent           Disposition:  PACU - hemodynamically stable.             Ashleigh Dow MD, MD  4/19/2022

## 2022-04-20 VITALS
HEIGHT: 74 IN | BODY MASS INDEX: 25.9 KG/M2 | TEMPERATURE: 98.6 F | RESPIRATION RATE: 18 BRPM | WEIGHT: 201.8 LBS | HEART RATE: 83 BPM | SYSTOLIC BLOOD PRESSURE: 135 MMHG | OXYGEN SATURATION: 100 % | DIASTOLIC BLOOD PRESSURE: 90 MMHG

## 2022-04-20 LAB
ANION GAP SERPL CALCULATED.3IONS-SCNC: 11 MMOL/L (ref 3–16)
BASOPHILS ABSOLUTE: 0 K/UL (ref 0–0.2)
BASOPHILS RELATIVE PERCENT: 0.2 %
BUN BLDV-MCNC: 30 MG/DL (ref 7–20)
CALCIUM SERPL-MCNC: 9.1 MG/DL (ref 8.3–10.6)
CHLORIDE BLD-SCNC: 105 MMOL/L (ref 99–110)
CO2: 23 MMOL/L (ref 21–32)
CREAT SERPL-MCNC: 1.4 MG/DL (ref 0.8–1.3)
EOSINOPHILS ABSOLUTE: 0 K/UL (ref 0–0.6)
EOSINOPHILS RELATIVE PERCENT: 0 %
GFR AFRICAN AMERICAN: >60
GFR NON-AFRICAN AMERICAN: 51
GLUCOSE BLD-MCNC: 124 MG/DL (ref 70–99)
HCT VFR BLD CALC: 43.5 % (ref 40.5–52.5)
HEMOGLOBIN: 14.4 G/DL (ref 13.5–17.5)
LYMPHOCYTES ABSOLUTE: 1 K/UL (ref 1–5.1)
LYMPHOCYTES RELATIVE PERCENT: 6.7 %
MCH RBC QN AUTO: 31 PG (ref 26–34)
MCHC RBC AUTO-ENTMCNC: 33.1 G/DL (ref 31–36)
MCV RBC AUTO: 93.7 FL (ref 80–100)
MONOCYTES ABSOLUTE: 1.1 K/UL (ref 0–1.3)
MONOCYTES RELATIVE PERCENT: 7.3 %
NEUTROPHILS ABSOLUTE: 12.7 K/UL (ref 1.7–7.7)
NEUTROPHILS RELATIVE PERCENT: 85.8 %
PDW BLD-RTO: 13.2 % (ref 12.4–15.4)
PLATELET # BLD: 220 K/UL (ref 135–450)
PMV BLD AUTO: 7.5 FL (ref 5–10.5)
POTASSIUM REFLEX MAGNESIUM: 4.6 MMOL/L (ref 3.5–5.1)
PROSTATE SPECIFIC ANTIGEN: 12.22 NG/ML (ref 0–4)
RBC # BLD: 4.64 M/UL (ref 4.2–5.9)
SODIUM BLD-SCNC: 139 MMOL/L (ref 136–145)
WBC # BLD: 14.8 K/UL (ref 4–11)

## 2022-04-20 PROCEDURE — 80048 BASIC METABOLIC PNL TOTAL CA: CPT

## 2022-04-20 PROCEDURE — 85025 COMPLETE CBC W/AUTO DIFF WBC: CPT

## 2022-04-20 PROCEDURE — 36415 COLL VENOUS BLD VENIPUNCTURE: CPT

## 2022-04-20 PROCEDURE — 6360000002 HC RX W HCPCS: Performed by: UROLOGY

## 2022-04-20 PROCEDURE — 2580000003 HC RX 258: Performed by: UROLOGY

## 2022-04-20 PROCEDURE — 6370000000 HC RX 637 (ALT 250 FOR IP): Performed by: UROLOGY

## 2022-04-20 RX ORDER — TAMSULOSIN HYDROCHLORIDE 0.4 MG/1
0.4 CAPSULE ORAL DAILY
Qty: 30 CAPSULE | Refills: 3 | Status: SHIPPED | OUTPATIENT
Start: 2022-04-21

## 2022-04-20 RX ORDER — OXYCODONE HYDROCHLORIDE AND ACETAMINOPHEN 5; 325 MG/1; MG/1
1 TABLET ORAL EVERY 4 HOURS PRN
Qty: 28 TABLET | Refills: 0 | Status: SHIPPED | OUTPATIENT
Start: 2022-04-20 | End: 2022-04-27

## 2022-04-20 RX ADMIN — TAMSULOSIN HYDROCHLORIDE 0.4 MG: 0.4 CAPSULE ORAL at 08:29

## 2022-04-20 RX ADMIN — ONDANSETRON 4 MG: 2 INJECTION INTRAMUSCULAR; INTRAVENOUS at 00:40

## 2022-04-20 RX ADMIN — ONDANSETRON 4 MG: 2 INJECTION INTRAMUSCULAR; INTRAVENOUS at 11:00

## 2022-04-20 RX ADMIN — MORPHINE SULFATE 2 MG: 2 INJECTION, SOLUTION INTRAMUSCULAR; INTRAVENOUS at 08:30

## 2022-04-20 RX ADMIN — SODIUM CHLORIDE: 9 INJECTION, SOLUTION INTRAVENOUS at 01:23

## 2022-04-20 ASSESSMENT — PAIN DESCRIPTION - LOCATION: LOCATION: ABDOMEN;GROIN

## 2022-04-20 ASSESSMENT — PAIN SCALES - GENERAL
PAINLEVEL_OUTOF10: 0
PAINLEVEL_OUTOF10: 8

## 2022-04-20 ASSESSMENT — PAIN DESCRIPTION - DESCRIPTORS: DESCRIPTORS: ACHING;STABBING;DISCOMFORT

## 2022-04-20 NOTE — PROGRESS NOTES
Discharge instructions gone over, all questions answered. Catheter care education completed and understood by patient. IV removed with no complications. Patient refused script for oxycodone-acetaminophen. Patient wheeled out for discharge via wheelchair.

## 2022-04-20 NOTE — PLAN OF CARE
Problem: Pain:  Goal: Pain level will decrease  Description: Pain level will decrease  4/19/2022 2020 by Erendira Acosta RN  Outcome: Ongoing  Pt has PRN pain medication available upon request. Pt aware to let nursing know when pain medication is needed. 4/19/2022 0827 by Hayden Diaz RN  Outcome: Ongoing  Goal: Control of acute pain  Description: Control of acute pain  4/19/2022 2020 by Erendira Acosta RN  Outcome: Ongoing  4/19/2022 0827 by Hayden Diaz RN  Outcome: Ongoing  Goal: Control of chronic pain  Description: Control of chronic pain  4/19/2022 2020 by Erendira Acosta RN  Outcome: Ongoing  4/19/2022 0827 by Hayden Diaz RN  Outcome: Ongoing     Problem: Urinary Elimination:  Goal: Ability to achieve a balanced intake and output will improve  Description: Ability to achieve a balanced intake and output will improve  4/19/2022 2020 by Erendira Acosta RN  Outcome: Ongoing  Beltrán remains in place and patent. Clear, yellow urine noted.   4/19/2022 0827 by Hayden Diaz RN  Outcome: Ongoing     Problem: Falls - Risk of:  Goal: Will remain free from falls  Description: Will remain free from falls  Outcome: Ongoing  Fall precautions in place, hourly rounding, call light and belongings in reach, bed in lowest position, wheels locked in place, side rails up x 2, walkways free of clutter    Goal: Absence of physical injury  Description: Absence of physical injury  Outcome: Ongoing

## 2022-04-20 NOTE — PLAN OF CARE
Problem: Pain:  Goal: Pain level will decrease  Description: Pain level will decrease  4/20/2022 0855 by Greg Peacock RN  Outcome: Progressing     Problem: Pain:  Goal: Control of acute pain  Description: Control of acute pain  4/20/2022 0855 by Greg Paecock RN  Outcome: Progressing     Problem: Pain:  Goal: Control of chronic pain  Description: Control of chronic pain  4/20/2022 0855 by Greg Peacock RN  Outcome: Progressing     Problem: Urinary Elimination:  Goal: Ability to achieve a balanced intake and output will improve  Description: Ability to achieve a balanced intake and output will improve  4/20/2022 0855 by Greg Peacock RN  Outcome: Progressing     Problem: Falls - Risk of:  Goal: Will remain free from falls  Description: Will remain free from falls  4/20/2022 0855 by Greg Peacock RN  Outcome: Progressing     Problem: Falls - Risk of:  Goal: Absence of physical injury  Description: Absence of physical injury  4/20/2022 0855 by Greg Peacock RN  Outcome: Progressing     Problem: Discharge Planning  Goal: Discharge to home or other facility with appropriate resources  Outcome: Progressing

## 2022-04-20 NOTE — PROGRESS NOTES
MD Whitney Lam MD Veneda Napoleon, MD               Office: [phone]                      Fax: [phone]             [facility]                   NEPHROLOGY INPATIENT PROGRESS NOTE:     PATIENT NAME: Fidelia Pace  : 1960  MRN: [id number]         RECOMMENDATIONS:   -post-obstrutive polyuric - so encourage ~ 2L of PO hydration /day     -Urology follow-up, stone work-up  \"Continue Flomax and Continue smith. He is scheduled for voiding trial Friday afternoon in the office. He will remove his smith early in the AM Friday. Will need to trend his PSA and consider a biopsy, he may also require a bladder outlet procedure such as TURP pending prostate cancer screening and voiding trial.  Repeat imaging for for hemorrhagic renal cyst x3-6 months. . He is not on [facility]. This is likely related to uncontrolled HTN. BP control as outpatient. Recommend PCP f/u. He can be discharged today from  standpoint when medically cleared  \"    -Avoid NSAIDs like ketorolac, low-dose morphine for pain control  - at higher risk for decompensation, needing closer monitoring.    - assistance for PCP referral and financial aid     D/C plan from renal stand point:  I suggested to f/u w/ us in ~1 mon, w/ labs    Discussed with patient, team - hospitalist,  Urology too      IMPRESSION:       Admitted for:  JOE (acute kidney injury) ([facility] [N17.9]  Acute kidney injury ([facility] [N17.9]  Hydronephrosis with ureteropelvic junction (UPJ) obstruction [Q62.11]  Calculus of ureterovesical junction (UVJ) [N20.1]      JOE (no known hx of CKD) - no labs since 2015  - BL Scr- 1.1, eGFR >60 as in 2015 ---> 2.7 on admission  -: Etiology of JOE - pre-renal w/ n/v, obstructive uropathy  - UA results reviewed showing trace ketones, small blood, 100 protein, hyaline casts, mild RBCs.  -Explained by current presentation,   -No need for any serology work-up currently    Obstructive uropathy  15 mm stone lodged in the ureteral vesicle junction on the right. Mild-to-moderate hydronephrosis   mild hydroureter bilaterally - more prominent on the right. Moderate distention of the urinary bladder-an element of bladder outlet obstruction with moderate prostatic enlargement. Associated problems:   : Na: hyponatremia - mild  - Azotemia: pre-renal  - Electrolytes: K: WNL  Hypochloremia   - Acid-Base: WNL  - Anemia: WNL    Other major problems: Management per primary and other consulting teams.   //  Hospital Problems           Last Modified POA    * (Principal) JOE (acute kidney injury) [facility] 4/18/2022 Yes            Please refer to the orders. High Complexity. Multiple complex problems. Discussed with patient and treatment team-    Time spent > 30 (~35) minutes. Thank you for allowing me to participate in this patient's care. Please do not hesitate to contact me anytime. We will follow along with you. aCta Talley MD,  [facility] [address] [place] [address] [place]: [phone] or Via [employer]  Fax: [phone]        =======================================================================================   =======================================================================================  Subjective  S/p OR - Dr Loretta Wise   \"Findings: successful treatment of the 15 bladder stone. I do think he is having urinary retention with overflow, which is why I placed the Beltrán catheter in him. PSA pending for the AM. He would be a candidate for TURP. \"\"    No current active complaints. Patient denied chest pain / dizziness/lightheadedness/syncope/ SOB / leg edema. Past medical, Surgical, Social, Family medical history reviewed by me. PAST MEDICAL HISTORY: History reviewed. No pertinent past medical history. PAST SURGICAL HISTORY:   Past Surgical History:   Procedure Laterality Date    LITHOTRIPSY       FAMILY HISTORY: History reviewed.  No pertinent family history. SOCIAL HISTORY:   Social History     Socioeconomic History    Marital status:      Spouse name: None    Number of children: 7    Years of education: None    Highest education level: None   Occupational History    None   Tobacco Use    Smoking status: Never Smoker    Smokeless tobacco: Never Used   Vaping Use    Vaping Use: Never used   Substance and Sexual Activity    Alcohol use: Not Currently    Drug use: Not Currently    Sexual activity: None   Other Topics Concern    None   Social History Narrative    None     Social Determinants of Health     Financial Resource Strain:     Difficulty of Paying Living Expenses: Not on file   Food Insecurity:     Worried About Running Out of Food in the Last Year: Not on file    Lucy of Food in the Last Year: Not on file   Transportation Needs:     Lack of Transportation (Medical): Not on file    Lack of Transportation (Non-Medical): Not on file   Physical Activity:     Days of Exercise per Week: Not on file    Minutes of Exercise per Session: Not on file   Stress:     Feeling of Stress : Not on file   Social Connections:     Frequency of Communication with Friends and Family: Not on file    Frequency of Social Gatherings with Friends and Family: Not on file    Attends Gnosticism Services: Not on file    Active Member of [address] [place] [address] [employer] or Organizations: Not on file    Attends Club or Organization Meetings: Not on file    Marital Status: Not on file   Intimate Partner Violence:     Fear of Current or Ex-Partner: Not on file    Emotionally Abused: Not on file    Physically Abused: Not on file    Sexually Abused: Not on file   Housing Stability:     Unable to Pay for Housing in the Last Year: Not on file    Number of [place] in the Last Year: Not on file    Unstable Housing in the Last Year: Not on file          MEDICATIONS: reviewed by me.    Medications Prior to Admission:  No current facility-administered medications on file prior to encounter. No current outpatient medications on file prior to encounter. Current Facility-Administered Medications:     tamsulosin (FLOMAX) capsule 0.4 mg, 0.4 mg, Oral, Daily, Collette Squires, MD, 0.4 mg at 04/20/22 9482    sodium chloride flush 0.9 % injection 5-40 mL, 5-40 mL, IntraVENous, 2 times per day, Collette Squires, MD, 10 mL at 04/18/22 2214    sodium chloride flush 0.9 % injection 5-40 mL, 5-40 mL, IntraVENous, PRN, Collette Squires, MD    0.9 % sodium chloride infusion, , IntraVENous, PRN, Collette Squires, MD, Stopped at 04/20/22 1007    [Held by provider] enoxaparin (LOVENOX) injection 40 mg, 40 mg, SubCUTAneous, Daily, Loraine Maradiaga MD    ondansetron (ZOFRAN-ODT) disintegrating tablet 4 mg, 4 mg, Oral, Q8H PRN **OR** ondansetron (ZOFRAN) injection 4 mg, 4 mg, IntraVENous, Q6H PRN, Collette Squires, MD, 4 mg at 04/20/22 0040    polyethylene glycol (GLYCOLAX) packet 17 g, 17 g, Oral, Daily PRN, Collette Squires, MD    acetaminophen (TYLENOL) tablet 650 mg, 650 mg, Oral, Q6H PRN **OR** acetaminophen (TYLENOL) suppository 650 mg, 650 mg, Rectal, Q6H PRN, Collette Squires, MD    morphine (PF) injection 2 mg, 2 mg, IntraVENous, Q4H PRN, Collette Squires, MD, 2 mg at 04/20/22 0830    oxyCODONE-acetaminophen (PERCOCET) 5-325 MG per tablet 1 tablet, 1 tablet, Oral, Q4H PRN, Collette Squires, MD, 1 tablet at 04/19/22 1907    0.9 % sodium chloride infusion, , IntraVENous, Continuous, Collette Squires, MD, Stopped at 04/20/22 1007      Allergies reviewed by me: Patient has no known allergies. REVIEW OF SYSTEMS:  As mentioned in chief complaint and HPI , Subjective   All other 10-point review of systems: negative.      =======================================================================================     PHYSICAL EXAM:  Recent vital signs and recent I/Os reviewed by me.      Wt Readings from Last 3 Encounters:   04/18/22 201 lb 12.8 oz (91.5 kg) BP Readings from Last 3 Encounters:   04/20/22 (!) 135/90   04/19/22 (!) 89/54     Patient Vitals for the past 24 hrs:   BP Temp Temp src Pulse Resp SpO2   04/20/22 0740 (!) 135/90 98.6 °F (37 °C) Oral 83 18 100 %   04/20/22 0430 (!) 169/93 98.7 °F (37.1 °C) Oral 86 18 97 %   04/20/22 0056 (!) 189/103 -- -- 140 -- --   04/20/22 0044 (!) 154/76 99.2 °F (37.3 °C) Oral 105 16 99 %   04/19/22 2000 127/80 98.7 °F (37.1 °C) Oral 97 16 97 %   04/19/22 1515 -- -- -- -- -- 97 %   04/19/22 1500 -- -- -- -- -- 99 %   04/19/22 1412 (!) 152/83 98.2 °F (36.8 °C) Oral 85 16 99 %   04/19/22 1345 (!) 140/80 98.6 °F (37 °C) Oral 80 16 99 %   04/19/22 1315 136/81 98 °F (36.7 °C) Oral 81 15 100 %   04/19/22 1310 (!) 141/74 -- -- 80 14 100 %   04/19/22 1305 128/69 97.5 °F (36.4 °C) Temporal 85 14 99 %   04/19/22 1300 115/61 -- -- 83 12 99 %   04/19/22 1255 107/64 -- -- 82 11 99 %   04/19/22 1250 (!) 99/57 97.7 °F (36.5 °C) Temporal 76 10 93 %   04/19/22 1144 (!) 143/84 96.9 °F (36.1 °C) Temporal 95 20 97 %       Intake/Output Summary (Last 24 hours) at 4/20/2022 1029  Last data filed at 4/20/2022 0709  Gross per 24 hour   Intake 860 ml   Output 5750 ml   Net -4890 ml         Physical Exam  Vitals reviewed. Constitutional:       General: He is not in acute distress. Appearance: Normal appearance. HENT:      Head: Normocephalic and atraumatic. Right Ear: External ear normal.      Left Ear: External ear normal.      Nose: Nose normal.      Mouth/Throat:      Mouth: Mucous membranes are moist. Mucous membranes are not dry. Eyes:      General: No scleral icterus. Conjunctiva/sclera: Conjunctivae normal.   Neck:      Vascular: No JVD. Cardiovascular:      Rate and Rhythm: Normal rate and regular rhythm. Heart sounds: S1 normal and S2 normal.   Pulmonary:      Effort: Pulmonary effort is normal. No respiratory distress. Breath sounds: Normal breath sounds.    Abdominal:      General: Bowel sounds are normal. There is no distension. Musculoskeletal:         General: No swelling or deformity. Cervical back: Normal range of motion and neck supple. Skin:     General: Skin is dry. Coloration: Skin is not jaundiced. Neurological:      Mental Status: He is alert and oriented to person, place, and time. Mental status is at baseline. Psychiatric:         Mood and Affect: Mood normal.         Behavior: Behavior normal.             =======================================================================================     DATA:  Diagnostic tests reviewed by me for today's visit:   (AS NEEDED FOR MY EVALUATION AND MANAGEMENT). Recent Labs     04/18/22  1542 04/19/22  0742 04/20/22  0429   WBC 20.1* 13.6* 14.8*   HCT 50.6 46.5 43.5    214 220     Iron Saturation:  No components found for: PERCENTFE  FERRITIN:  No results found for: FERRITIN  IRON:  No results found for: IRON  TIBC:  No results found for: TIBC    Recent Labs     04/18/22  1542 04/19/22  0742 04/20/22  0429   * 137 139   K 4.2 4.5 4.6   CL 96* 100 105   CO2 22 23 23   BUN 34* 53* 30*   CREATININE 2.7* 3.7* 1.4*     Recent Labs     04/18/22  1542 04/19/22  0742 04/20/22  0429   CALCIUM 10.4 9.5 9.1     No results for input(s): PH, PCO2, PO2 in the last 72 hours.     Invalid input(s): Pasqual Splinter    ABG:  No results found for: PH, PCO2, PO2, HCO3, BE, THGB, TCO2, O2SAT  VBG:  No results found for: PHVEN, FKD1LNJ, BEVEN, X8IZMCTL    LDH:  No results found for: LDH  Uric Acid:  No results found for: LABURIC, URICACID    PT/INR:  No results found for: PROTIME, INR  Warfarin PT/INR:  No components found for: PTPATWAR, PTINRWAR  PTT:  No results found for: APTT, PTT[APTT}  Last 3 Troponin:  No results found for: TROPONINI    U/A:    Lab Results   Component Value Date    COLORU Yellow 04/18/2022    PROTEINU 100 04/18/2022    PHUR 6.0 04/18/2022    WBCUA 5 04/18/2022    RBCUA 52 04/18/2022    CLARITYU Clear 04/18/2022 SPECGRAV 1.020 04/18/2022    LEUKOCYTESUR Negative 04/18/2022    UROBILINOGEN 0.2 04/18/2022    BILIRUBINUR Negative 04/18/2022    BLOODU SMALL 04/18/2022    GLUCOSEU 100 04/18/2022     Microalbumen/Creatinine ratio:  No components found for: RUCREAT  24 Hour Urine for Protein:  No components found for: RAWUPRO, UHRS3, RYUV44TM, UTV3  24 Hour Urine for Creatinine Clearance:  No components found for: CREAT4, UHRS10, UTV10  Urine Toxicology:  No components found for: IAMMENTA, IBARBIT, IBENZO, ICOCAINE, IMARTHC, IOPIATES, IPHENCYC    HgBA1c:  No results found for: LABA1C  RPR:  No results found for: RPR  HIV:  No results found for: HIV  PANDA:  No results found for: ANATITER, PANDA  RF:  No results found for: RF  DSDNA:  No components found for: DNA  AMYLASE:  No results found for: AMYLASE  LIPASE:    Lab Results   Component Value Date    LIPASE 372.0 04/18/2022     Fibrinogen Level:  No components found for: FIB       BELOW MENTIONED RADIOLOGY STUDY RESULTS BY ME (AS NEEDED FOR MY EVALUATION AND MANAGEMENT). CT ABDOMEN PELVIS WO CONTRAST Additional Contrast? None    Result Date: 4/18/2022  EXAMINATION: CT OF THE ABDOMEN AND PELVIS WITHOUT CONTRAST 4/18/2022 4:16 pm TECHNIQUE: CT of the abdomen and pelvis was performed without the administration of intravenous contrast. Multiplanar reformatted images are provided for review. Dose modulation, iterative reconstruction, and/or weight based adjustment of the mA/kV was utilized to reduce the radiation dose to as low as reasonably achievable. COMPARISON: None.  HISTORY: ORDERING SYSTEM PROVIDED HISTORY: left flank pain r/o stone or constipation TECHNOLOGIST PROVIDED HISTORY: Reason for exam:->left flank pain r/o stone or constipation Additional Contrast?->None Decision Support Exception - unselect if not a suspected or confirmed emergency medical condition->Emergency Medical Condition (MA) Reason for Exam: Flank Pain (Left flank pain radiating to the groin with difficulty urination for two days; fevers, vomitting & has not urinated in 5 hours. Hx of kidney stones with lithotripsy in past.  ) FINDINGS: Lower Chest: The lung bases are clear. Organs: The liver is mildly enlarged with hypertrophy of the caudate and left lobes. The gallbladder has been removed. The bile ducts are normal. There are numerous punctate calcifications scattered throughout the pancreas with no obvious pancreatic mass or active inflammatory changes. The spleen is grossly normal size. There is moderate thickening of the left adrenal gland. The right adrenals unremarkable. There is moderate stranding around both kidneys with mild to moderate hydronephrosis bilaterally. There is mild hydroureter seen down to the bladder which is more prominent on the right. There are small cysts along both kidneys inferiorly and there is a 4.2 cm hyperdense mass along the upper pole left kidney posteriorly. There is a 2 mm stone lower pole right kidney. There is mild stranding around both kidneys extending inferiorly. The bladder is moderately distended. There is a 15 x 13 mm irregular calcification lodged in the ureteral vesicle junction on the right. GI/Bowel: The appendix is normal.  There are multiple loops of mildly dilated large and small bowel throughout the abdomen with small air-fluid levels throughout. There is no obvious bowel wall thickening. Pelvis: The prostate gland is moderately enlarged and partially calcified. There is no adenopathy or ascites. Peritoneum/Retroperitoneum:   There is mild calcified plaque throughout the aorta which is nondilated with no aneurysm and no retroperitoneal mass or adenopathy. Bones/Soft Tissues: The bones are intact. No aggressive osseous lesion is seen. Mild-to-moderate hydronephrosis and mild hydroureter bilaterally seen down to the bladder which is more prominent on the right. There is a 15 mm stone lodged in the ureteral vesicle junction on the right. Moderate distention of the urinary bladder which may represent an element of bladder outlet obstruction with moderate prostatic enlargement. Recommend urological follow-up. Questionable diffuse mild ileus. Status post cholecystectomy with calcifications throughout the pancreas in keeping with chronic calcific pancreatitis with no obvious active inflammation or mass. Moderate adrenal thickening on the left which could be due to hyperplasia. Recommend correlating with lab values. Multiple cystic masses in both kidneys with a 4 cm hyperdense mass upper pole left kidney which probably represents a hemorrhagic cyst.  Recommend ultrasound correlation.

## 2022-04-20 NOTE — PROGRESS NOTES
PM assessment completed. Pt resting well in bed, denies pain at this time. Beltrán remains in place and patent. No needs voiced. Fall precautions in place, hourly rounding, call light and belongings in reach, bed in lowest position, wheels locked in place, side rails up x 2, walkways free of clutter. Bed alarm on. Pt aware to call staff for assistance out of bed.

## 2022-04-20 NOTE — PROGRESS NOTES
Shift assessment completed, morning medication given per MAR. VSS, alert and oriented. Call light within reach. The care plan and education has been reviewed and mutually agreed upon with the patient.

## 2022-04-20 NOTE — PROGRESS NOTES
Urology Progress Note  [facility]    Provider: KIM Colon CNP  Patient ID:  Admission Date: 2022 Name: Ольга Prado  OR Date: 2022 MRN: [id number]   Patient Location: [id number] : 1960  Attending: Daniel Fuller MD Date of Service: 2022  PCP: No primary care provider on file. Diagnoses:  Ureteral Stone, Right  Elevated PSA  Urinary Retention  Hemorrhagic renal cyst  JOE    Assessment/Plan:  Ureteral Stone, Right  -S/p cystolitholapaxy 2022  -Good ureteral jet bilaterally, no stent insertion  Elevated PSA  -12.22 on   -This was drawn in the setting of urinary retention   -ARMANDO: 2+, Soft, no nodules, some asymmetry on right  Urinary Retention  -Unknown bladder residual  -Moderate distension on CT, enlarged prostate  -Smith in place draining clear yellow  -Would benefit from a TURP per cysto yesterday  JOE  -Cr down to 1.4 today from 3.7 yesterday  -No established baseline renal function  Hemorrhagic renal cyst  -L>R, 4 cm on left  -KAITLIN reassuring this is hemorhagic cyst likely in setting of uncontrolled HTN        Recc    Continue Flomax and Continue smith. He is scheduled for voiding trial Friday afternoon in the office. He will remove his smith early in the AM Friday. Will need to trend his PSA and consider a biopsy, he may also require a bladder outlet procedure such as TURP pending prostate cancer screening and voiding trial.  Repeat imaging for for hemorrhagic renal cyst x3-6 months. . He is not on [facility]. This is likely related to uncontrolled HTN. BP control as outpatient. Recommend PCP f/u. He can be discharged today from  standpoint when medically cleared      The patient had a chance to ask questions which were answered. he understands the above plan. Subjective:   Ольга Prado is a 57 y.o. male. He was seen and examined this morning. Today we discussed follow up in the office Friday. Discussed the cyst, bph, stones and VT.

## 2022-04-20 NOTE — PLAN OF CARE
Problem: Pain:  Goal: Pain level will decrease  Description: Pain level will decrease  4/20/2022 1154 by Darnell Grissom RN  Outcome: Completed     Problem: Pain:  Goal: Control of acute pain  Description: Control of acute pain  4/20/2022 1154 by Darnell Grissom RN  Outcome: Completed     Problem: Pain:  Goal: Control of chronic pain  Description: Control of chronic pain  4/20/2022 1154 by Darnell Grissom RN  Outcome: Completed     Problem: Urinary Elimination:  Goal: Ability to achieve a balanced intake and output will improve  Description: Ability to achieve a balanced intake and output will improve  4/20/2022 1154 by Darnell Grissom RN  Outcome: Completed     Problem: Falls - Risk of:  Goal: Will remain free from falls  Description: Will remain free from falls  4/20/2022 1154 by Darnell Grissom RN  Outcome: Completed     Problem: Falls - Risk of:  Goal: Absence of physical injury  Description: Absence of physical injury  4/20/2022 1154 by Darnell Grissom RN  Outcome: Completed     Problem: Discharge Planning  Goal: Discharge to home or other facility with appropriate resources  4/20/2022 1154 by Darnell Grissom RN  Outcome: Completed

## 2022-04-22 LAB
CALCULI COMPOSITION: NORMAL
MASS: 403 MG
STONE DESCRIPTION: NORMAL

## 2022-04-22 NOTE — DISCHARGE SUMMARY
[address] [place] [address] DISCHARGE SUMMARY    Patient Demographics    PatientSera Fleming  Date of Birth. 1960  MRN. [id number]     Primary care provider. No primary care provider on file. (Tel: None)    Admit date: 4/18/2022    Discharge date (blank if same as Note Date): 4/20/2022  Note Date: 4/22/2022     Reason for Hospitalization. Chief Complaint   Patient presents with    Flank Pain     Left flank pain radiating to the groin with difficulty urination for two days; fevers, vomitting & has not urinated in 5 hours. Hx of kidney stones with lithotripsy in past.             [address] [place] [address] Dr Torres. Hydronephrosis with obstructing renal stone with a 50 mm stone-patient underwent cystoscopy with stent placement.  -Patient was discharged stable with pain medication outpatient follow-up with urology for stent removal      Acute renal failure with creatinine as high as 3.7  -Improved with IV hydration. Removal.  ER by nephrology. Patient will be discharged stable      Consults. IP CONSULT TO UROLOGY    Physical examination on discharge day. BP (!) 135/90   Pulse 83   Temp 98.6 °F (37 °C) (Oral)   Resp 18   Ht 6' 2\" (1.88 m)   Wt 201 lb 12.8 oz (91.5 kg)   SpO2 100%   BMI 25.91 kg/m²   General appearance. Alert. Looks comfortable. HEENT. Sclera clear. Moist mucus membranes. Cardiovascular. Regular rate and rhythm, normal S1, S2. No murmur. Respiratory. Not using accessory muscles. Clear to auscultation bilaterally, no wheeze. Gastrointestinal. Abdomen soft, non-tender, not distended, normal bowel sounds  Neurology. Facial symmetry. No speech deficits. Moving all extremities equally. Extremities. No edema in lower extremities. Skin. Warm, dry, normal turgor    Condition at time of discharge stable     Medication instructions provided to patient at discharge.      Medication List      START taking these medications    oxyCODONE-acetaminophen 5-325 MG per tablet  Commonly known as: PERCOCET  Take 1 tablet by mouth every 4 hours as needed for Pain for up to 7 days. tamsulosin 0.4 MG capsule  Commonly known as: FLOMAX  Take 1 capsule by mouth daily           Where to Get Your Medications      These medications were sent to [address] [place] [address]  Lili Ingram [address]    Phone: [phone]   · tamsulosin 0.4 MG capsule     You can get these medications from any pharmacy    Bring a paper prescription for each of these medications  · oxyCODONE-acetaminophen 5-325 MG per tablet         Discharge recommendations given to patient. Follow Up. pcp in 1 week   Disposition. home  Activity. activity as tolerated  Diet: No diet orders on file      Spent 45  minutes in discharge process.     Signed:  Cheri Lopez MD     4/22/2022 3:31 PM

## 2022-04-23 ENCOUNTER — HOSPITAL ENCOUNTER (EMERGENCY)
Age: 62
Discharge: HOME | End: 2022-04-23

## 2022-04-23 ENCOUNTER — APPOINTMENT (OUTPATIENT)
Dept: GENERAL RADIOLOGY | Age: 62
End: 2022-04-23

## 2022-04-23 VITALS
OXYGEN SATURATION: 98 % | RESPIRATION RATE: 20 BRPM | DIASTOLIC BLOOD PRESSURE: 94 MMHG | WEIGHT: 201.8 LBS | TEMPERATURE: 98.3 F | HEART RATE: 88 BPM | HEIGHT: 74 IN | BODY MASS INDEX: 25.9 KG/M2 | SYSTOLIC BLOOD PRESSURE: 167 MMHG

## 2022-04-23 DIAGNOSIS — N39.0 URINARY TRACT INFECTION WITHOUT HEMATURIA, SITE UNSPECIFIED: Primary | ICD-10-CM

## 2022-04-23 DIAGNOSIS — R11.0 NAUSEA: ICD-10-CM

## 2022-04-23 LAB
A/G RATIO: 1.2 (ref 1.1–2.2)
ALBUMIN SERPL-MCNC: 3.9 G/DL (ref 3.4–5)
ALP BLD-CCNC: 70 U/L (ref 40–129)
ALT SERPL-CCNC: 28 U/L (ref 10–40)
ANION GAP SERPL CALCULATED.3IONS-SCNC: 10 MMOL/L (ref 3–16)
AST SERPL-CCNC: 24 U/L (ref 15–37)
BASOPHILS ABSOLUTE: 0.1 K/UL (ref 0–0.2)
BASOPHILS RELATIVE PERCENT: 0.7 %
BILIRUB SERPL-MCNC: 1 MG/DL (ref 0–1)
BILIRUBIN URINE: NEGATIVE
BLOOD, URINE: ABNORMAL
BUN BLDV-MCNC: 14 MG/DL (ref 7–20)
CALCIUM SERPL-MCNC: 9.6 MG/DL (ref 8.3–10.6)
CHLORIDE BLD-SCNC: 101 MMOL/L (ref 99–110)
CLARITY: CLEAR
CO2: 27 MMOL/L (ref 21–32)
COLOR: YELLOW
CREAT SERPL-MCNC: 0.9 MG/DL (ref 0.8–1.3)
EOSINOPHILS ABSOLUTE: 0.1 K/UL (ref 0–0.6)
EOSINOPHILS RELATIVE PERCENT: 0.8 %
EPITHELIAL CELLS, UA: 0 /HPF (ref 0–5)
GFR AFRICAN AMERICAN: >60
GFR NON-AFRICAN AMERICAN: >60
GLUCOSE BLD-MCNC: 114 MG/DL (ref 70–99)
GLUCOSE URINE: NEGATIVE MG/DL
HCT VFR BLD CALC: 47.4 % (ref 40.5–52.5)
HEMOGLOBIN: 15.6 G/DL (ref 13.5–17.5)
HYALINE CASTS: 1 /LPF (ref 0–8)
KETONES, URINE: NEGATIVE MG/DL
LEUKOCYTE ESTERASE, URINE: ABNORMAL
LYMPHOCYTES ABSOLUTE: 1.4 K/UL (ref 1–5.1)
LYMPHOCYTES RELATIVE PERCENT: 12.6 %
MCH RBC QN AUTO: 30.9 PG (ref 26–34)
MCHC RBC AUTO-ENTMCNC: 32.9 G/DL (ref 31–36)
MCV RBC AUTO: 93.8 FL (ref 80–100)
MICROSCOPIC EXAMINATION: YES
MONOCYTES ABSOLUTE: 0.7 K/UL (ref 0–1.3)
MONOCYTES RELATIVE PERCENT: 6.2 %
NEUTROPHILS ABSOLUTE: 8.9 K/UL (ref 1.7–7.7)
NEUTROPHILS RELATIVE PERCENT: 79.7 %
NITRITE, URINE: NEGATIVE
PDW BLD-RTO: 13.4 % (ref 12.4–15.4)
PH UA: 7 (ref 5–8)
PLATELET # BLD: 270 K/UL (ref 135–450)
PMV BLD AUTO: 7.7 FL (ref 5–10.5)
POTASSIUM REFLEX MAGNESIUM: 4.3 MMOL/L (ref 3.5–5.1)
PROTEIN UA: 100 MG/DL
RBC # BLD: 5.06 M/UL (ref 4.2–5.9)
RBC UA: 63 /HPF (ref 0–4)
SODIUM BLD-SCNC: 138 MMOL/L (ref 136–145)
SPECIFIC GRAVITY UA: 1.01 (ref 1–1.03)
TOTAL PROTEIN: 7.2 G/DL (ref 6.4–8.2)
URINE REFLEX TO CULTURE: YES
URINE TYPE: ABNORMAL
UROBILINOGEN, URINE: 0.2 E.U./DL
WBC # BLD: 11.1 K/UL (ref 4–11)
WBC UA: 10 /HPF (ref 0–5)

## 2022-04-23 PROCEDURE — 71046 X-RAY EXAM CHEST 2 VIEWS: CPT

## 2022-04-23 PROCEDURE — 6370000000 HC RX 637 (ALT 250 FOR IP): Performed by: PHYSICIAN ASSISTANT

## 2022-04-23 PROCEDURE — 87086 URINE CULTURE/COLONY COUNT: CPT

## 2022-04-23 PROCEDURE — 80053 COMPREHEN METABOLIC PANEL: CPT

## 2022-04-23 PROCEDURE — 36415 COLL VENOUS BLD VENIPUNCTURE: CPT

## 2022-04-23 PROCEDURE — 99284 EMERGENCY DEPT VISIT MOD MDM: CPT

## 2022-04-23 PROCEDURE — 81001 URINALYSIS AUTO W/SCOPE: CPT

## 2022-04-23 PROCEDURE — 51702 INSERT TEMP BLADDER CATH: CPT

## 2022-04-23 PROCEDURE — 85025 COMPLETE CBC W/AUTO DIFF WBC: CPT

## 2022-04-23 RX ORDER — CEPHALEXIN 500 MG/1
500 CAPSULE ORAL 4 TIMES DAILY
Qty: 28 CAPSULE | Refills: 0 | Status: SHIPPED | OUTPATIENT
Start: 2022-04-23 | End: 2022-04-24 | Stop reason: SDUPTHER

## 2022-04-23 RX ORDER — ONDANSETRON 4 MG/1
4 TABLET, ORALLY DISINTEGRATING ORAL EVERY 8 HOURS PRN
Qty: 9 TABLET | Refills: 0 | Status: SHIPPED | OUTPATIENT
Start: 2022-04-23 | End: 2022-04-23 | Stop reason: CLARIF

## 2022-04-23 RX ORDER — ONDANSETRON 4 MG/1
4 TABLET, ORALLY DISINTEGRATING ORAL EVERY 8 HOURS PRN
Qty: 9 TABLET | Refills: 0 | Status: SHIPPED | OUTPATIENT
Start: 2022-04-23 | End: 2022-04-24 | Stop reason: SDUPTHER

## 2022-04-23 RX ORDER — ONDANSETRON 4 MG/1
4 TABLET, ORALLY DISINTEGRATING ORAL ONCE
Status: COMPLETED | OUTPATIENT
Start: 2022-04-23 | End: 2022-04-23

## 2022-04-23 RX ORDER — CEPHALEXIN 500 MG/1
500 CAPSULE ORAL 4 TIMES DAILY
Qty: 28 CAPSULE | Refills: 0 | Status: SHIPPED | OUTPATIENT
Start: 2022-04-23 | End: 2022-04-23 | Stop reason: CLARIF

## 2022-04-23 RX ADMIN — ONDANSETRON 4 MG: 4 TABLET, ORALLY DISINTEGRATING ORAL at 18:42

## 2022-04-23 NOTE — ED NOTES
Placed pt in waiting room informed him we would call him back when we had results, pt stated he was not waiting being so uncomfortable and he would be leaving. Placed pt in wheelchair in the lobby.       Adelaida Ernandez RN  04/23/22 1932

## 2022-04-23 NOTE — ED PROVIDER NOTES
[facility]        Pt Name: Slava Madera  MRN: [id number]  [place] 1960  Date of evaluation: 4/23/2022  Provider: GERMAN Fang  PCP: No primary care provider on file. Note Started: 6:07 PM EDT       CYNTHIA. I have evaluated this patient. My supervising physician was available for consultation. CHIEF COMPLAINT       Chief Complaint   Patient presents with    Generalized Body Aches     Came in for c/o general body aches, fever, recently d/c'd from hospital, n/v.       HISTORY OF PRESENT ILLNESS   (Location, Timing/Onset, Context/Setting, Quality, Duration, Modifying Factors, Severity, Associated Signs and Symptoms)  Note limiting factors. Chief Complaint: Fever, Nausea, vomiting, body aches     Slava Madera is a 57 y.o. male who presents subjective fever starting this morning with associated nausea and vomiting today. Patient had a cystoscopy with lithotripsy on 4/19/22. Patient states that shortly after he was surgery he felt fine and even when ambulating with his friends. Patient states starting this morning he felt subjective fever but did not take his temperature. Then started having nausea and vomiting. Patient medication for symptoms today. Patient currently has a urinary catheter in place without complications. Patient denies any abdominal pain, chest pain, shortness of breath or headaches. Patient denies any leg swelling or calf pain. Patient states that he will get a cough shortly after he vomits. Patient states he is not taking any medication even though he was given Percocet for pain he has not taken it. He is taking the Flomax that was prescribed. He is not on any current antibiotics at this time. Nursing Notes were all reviewed and agreed with or any disagreements were addressed in the HPI.     REVIEW OF SYSTEMS    (2-9 systems for level 4, 10 or more for level 5)     Review of Systems    Positives and Pertinent negatives as per HPI. Except as noted above in the ROS, all other systems were reviewed and negative. PAST MEDICAL HISTORY   History reviewed. No pertinent past medical history. SURGICAL HISTORY     Past Surgical History:   Procedure Laterality Date    CYSTOSCOPY Right 4/19/2022    CYSTOSCOPY, HOLMIUM LASER LITHOTRIPSY OF BLADDER STONE performed by Carri Villar MD at [address] [facility]       Previous Medications    OXYCODONE-ACETAMINOPHEN (PERCOCET) 5-325 MG PER TABLET    Take 1 tablet by mouth every 4 hours as needed for Pain for up to 7 days. TAMSULOSIN (FLOMAX) 0.4 MG CAPSULE    Take 1 capsule by mouth daily         ALLERGIES     Patient has no known allergies. FAMILYHISTORY     History reviewed. No pertinent family history. SOCIAL HISTORY       Social History     Tobacco Use    Smoking status: Never Smoker    Smokeless tobacco: Never Used   Vaping Use    Vaping Use: Never used   Substance Use Topics    Alcohol use: Not Currently    Drug use: Not Currently       SCREENINGS    Marie Coma Scale  Eye Opening: Spontaneous  Best Verbal Response: Oriented  Best Motor Response: Obeys commands  [place] Coma Scale Score: 15        PHYSICAL EXAM    (up to 7 for level 4, 8 or more for level 5)     ED Triage Vitals [04/23/22 1758]   BP Temp Temp Source Pulse Resp SpO2 Height Weight   (!) 167/94 98.3 °F (36.8 °C) Oral 105 20 98 % 6' 2\" (1.88 m) 201 lb 12.8 oz (91.5 kg)       Physical Exam  Vitals and nursing note reviewed. Constitutional:       General: He is not in acute distress. Appearance: Normal appearance. He is normal weight. He is not ill-appearing. Cardiovascular:      Rate and Rhythm: Normal rate and regular rhythm. Pulses: Normal pulses. Heart sounds: Normal heart sounds. Pulmonary:      Effort: Pulmonary effort is normal. No respiratory distress. Breath sounds: Normal breath sounds. No stridor. No wheezing, rhonchi or rales. Abdominal:      General: Abdomen is flat. Bowel sounds are normal. There is no distension. Palpations: Abdomen is soft. There is no mass. Tenderness: There is no abdominal tenderness. Genitourinary:     Penis: Normal.       Testes: Normal.      Comments: Urinary catheter in place without signs of infection or obstruction. Musculoskeletal:      Cervical back: Normal range of motion and neck supple. No rigidity. Skin:     General: Skin is warm. Capillary Refill: Capillary refill takes less than 2 seconds. Neurological:      Mental Status: He is alert and oriented to person, place, and time. Psychiatric:         Mood and Affect: Mood normal.         Behavior: Behavior normal.         DIAGNOSTIC RESULTS   LABS:    Labs Reviewed   CBC WITH AUTO DIFFERENTIAL   COMPREHENSIVE METABOLIC PANEL W/ REFLEX TO MG FOR LOW K   URINALYSIS WITH REFLEX TO CULTURE       When ordered only abnormal lab results are displayed. All other labs were within normal range or not returned as of this dictation. EKG: When ordered, EKG's are interpreted by the Emergency Department Physician in the absence of a cardiologist.  Please see their note for interpretation of EKG. RADIOLOGY:   Non-plain film images such as CT, Ultrasound and MRI are read by the radiologist. Plain radiographic images are visualized and preliminarily interpreted by the ED Provider with the below findings:        Interpretation per the Radiologist below, if available at the time of this note:    XR CHEST (2 VW)    (Results Pending)     CT ABDOMEN PELVIS WO CONTRAST Additional Contrast? None    Result Date: 4/18/2022  EXAMINATION: CT OF THE ABDOMEN AND PELVIS WITHOUT CONTRAST 4/18/2022 4:16 pm TECHNIQUE: CT of the abdomen and pelvis was performed without the administration of intravenous contrast. Multiplanar reformatted images are provided for review.  Dose modulation, iterative reconstruction, and/or weight based adjustment of the mA/kV was utilized to reduce the radiation dose to as low as reasonably achievable. COMPARISON: None. HISTORY: ORDERING SYSTEM PROVIDED HISTORY: left flank pain r/o stone or constipation TECHNOLOGIST PROVIDED HISTORY: Reason for exam:->left flank pain r/o stone or constipation Additional Contrast?->None Decision Support Exception - unselect if not a suspected or confirmed emergency medical condition->Emergency Medical Condition (MA) Reason for Exam: Flank Pain (Left flank pain radiating to the groin with difficulty urination for two days; fevers, vomitting & has not urinated in 5 hours. Hx of kidney stones with lithotripsy in past.  ) FINDINGS: Lower Chest: The lung bases are clear. Organs: The liver is mildly enlarged with hypertrophy of the caudate and left lobes. The gallbladder has been removed. The bile ducts are normal. There are numerous punctate calcifications scattered throughout the pancreas with no obvious pancreatic mass or active inflammatory changes. The spleen is grossly normal size. There is moderate thickening of the left adrenal gland. The right adrenals unremarkable. There is moderate stranding around both kidneys with mild to moderate hydronephrosis bilaterally. There is mild hydroureter seen down to the bladder which is more prominent on the right. There are small cysts along both kidneys inferiorly and there is a 4.2 cm hyperdense mass along the upper pole left kidney posteriorly. There is a 2 mm stone lower pole right kidney. There is mild stranding around both kidneys extending inferiorly. The bladder is moderately distended. There is a 15 x 13 mm irregular calcification lodged in the ureteral vesicle junction on the right. GI/Bowel: The appendix is normal.  There are multiple loops of mildly dilated large and small bowel throughout the abdomen with small air-fluid levels throughout. There is no obvious bowel wall thickening. Pelvis:    The prostate gland is moderately enlarged and partially calcified. There is no adenopathy or ascites. Peritoneum/Retroperitoneum:   There is mild calcified plaque throughout the aorta which is nondilated with no aneurysm and no retroperitoneal mass or adenopathy. Bones/Soft Tissues: The bones are intact. No aggressive osseous lesion is seen. Mild-to-moderate hydronephrosis and mild hydroureter bilaterally seen down to the bladder which is more prominent on the right. There is a 15 mm stone lodged in the ureteral vesicle junction on the right. Moderate distention of the urinary bladder which may represent an element of bladder outlet obstruction with moderate prostatic enlargement. Recommend urological follow-up. Questionable diffuse mild ileus. Status post cholecystectomy with calcifications throughout the pancreas in keeping with chronic calcific pancreatitis with no obvious active inflammation or mass. Moderate adrenal thickening on the left which could be due to hyperplasia. Recommend correlating with lab values. Multiple cystic masses in both kidneys with a 4 cm hyperdense mass upper pole left kidney which probably represents a hemorrhagic cyst.  Recommend ultrasound correlation. US RENAL COMPLETE    Result Date: 4/19/2022  EXAMINATION: RETROPERITONEAL ULTRASOUND OF THE KIDNEYS AND URINARY BLADDER 4/19/2022 COMPARISON: CT abdomen and pelvis 04/18/2022 HISTORY: ORDERING SYSTEM PROVIDED HISTORY: CT 04/18 \"Multiple cystic masses in both kidneys with a 4 cm hyperdense mass upper pole, reccomend US correlation\" TECHNOLOGIST PROVIDED HISTORY: Reason for exam:->CT 04/18 \"Multiple cystic masses in both kidneys with a 4 cm hyperdense mass upper pole, reccomend US correlation\" FINDINGS: Kidneys: The right kidney measures 12 x 5 x 4 cm in length and the left kidney measures 12 x 6 x 7 cm in length. Kidneys demonstrate normal cortical echogenicity. No evidence of hydronephrosis or intrarenal stones.   There are multiple hypoechoic cystic masses in the right kidney with the largest measuring 2.5 cm along the upper pole. The cysts are all well-circumscribed with no echoes or septations. There multiple cystic masses in the left kidney with the largest seen along the upper pole measuring 5 x 3.7 cm and correlates with the hyperdense mass along the upper pole on the recent CT scan. Bladder: There is a Beltrán catheter in the bladder which is only mildly distended. No hydronephrosis or renal stones Multiple cystic masses scattered in both kidneys which have a benign sonographic appearance. The largest mass along the upper pole left kidney measures 5 cm and appears to correlate with the hyperdense mass described on the recent CT scan and probably represent a hemorrhagic cyst. Status post Beltrán catheter placement in good position           PROCEDURES   Unless otherwise noted below, none     Procedures    CRITICAL CARE TIME       CONSULTS:  None      EMERGENCY DEPARTMENT COURSE and DIFFERENTIAL DIAGNOSIS/MDM:   Vitals:    Vitals:    04/23/22 1758 04/23/22 1806   BP: (!) 167/94    Pulse: 105 88   Resp: 20    Temp: 98.3 °F (36.8 °C)    TempSrc: Oral    SpO2: 98%    Weight: 201 lb 12.8 oz (91.5 kg)    Height: 6' 2\" (1.88 m)        Patient was given the following medications:  Medications   ondansetron (ZOFRAN-ODT) disintegrating tablet 4 mg (has no administration in time range)           17-year-old male presents with subjective fevers starting this morning with nausea and vomiting. Patient had lithotripsy done on 4/19/22. Patient states that he had subjective fever and chills. Here in the emergency department urinalysis did show a urinary tract infection patient does have a urinary catheter in place for now. Patient was given Zofran in the emergency department which did help with the nausea and vomiting.   Patient will be given a prescription for antibiotics and to follow-up with Dr. Mia Dash on Monday or his primary care

## 2022-04-24 RX ORDER — CEPHALEXIN 500 MG/1
500 CAPSULE ORAL 4 TIMES DAILY
Qty: 28 CAPSULE | Refills: 0 | Status: SHIPPED | OUTPATIENT
Start: 2022-04-24 | End: 2022-05-01

## 2022-04-24 RX ORDER — ONDANSETRON 4 MG/1
4 TABLET, ORALLY DISINTEGRATING ORAL EVERY 8 HOURS PRN
Qty: 9 TABLET | Refills: 0 | Status: SHIPPED | OUTPATIENT
Start: 2022-04-24 | End: 2022-04-27

## 2022-04-25 LAB — URINE CULTURE, ROUTINE: NORMAL

## 2022-05-09 ENCOUNTER — HOSPITAL ENCOUNTER (EMERGENCY)
Age: 62
Discharge: HOME | End: 2022-05-09

## 2022-05-09 VITALS
DIASTOLIC BLOOD PRESSURE: 89 MMHG | OXYGEN SATURATION: 100 % | TEMPERATURE: 98.5 F | HEART RATE: 99 BPM | RESPIRATION RATE: 18 BRPM | SYSTOLIC BLOOD PRESSURE: 145 MMHG

## 2022-05-09 DIAGNOSIS — R33.9 URINARY RETENTION: Primary | ICD-10-CM

## 2022-05-09 DIAGNOSIS — N30.01 ACUTE CYSTITIS WITH HEMATURIA: ICD-10-CM

## 2022-05-09 LAB
ANION GAP SERPL CALCULATED.3IONS-SCNC: 10 MMOL/L (ref 3–16)
BACTERIA: ABNORMAL /HPF
BASOPHILS ABSOLUTE: 0.1 K/UL (ref 0–0.2)
BASOPHILS RELATIVE PERCENT: 0.7 %
BILIRUBIN URINE: NEGATIVE
BLOOD, URINE: ABNORMAL
BUN BLDV-MCNC: 11 MG/DL (ref 7–20)
CALCIUM SERPL-MCNC: 9.8 MG/DL (ref 8.3–10.6)
CHLORIDE BLD-SCNC: 102 MMOL/L (ref 99–110)
CLARITY: CLEAR
CO2: 24 MMOL/L (ref 21–32)
COLOR: YELLOW
CREAT SERPL-MCNC: 0.9 MG/DL (ref 0.8–1.3)
EOSINOPHILS ABSOLUTE: 0.2 K/UL (ref 0–0.6)
EOSINOPHILS RELATIVE PERCENT: 2.8 %
EPITHELIAL CELLS, UA: 1 /HPF (ref 0–5)
GFR AFRICAN AMERICAN: >60
GFR NON-AFRICAN AMERICAN: >60
GLUCOSE BLD-MCNC: 100 MG/DL (ref 70–99)
GLUCOSE URINE: NEGATIVE MG/DL
HCT VFR BLD CALC: 43.7 % (ref 40.5–52.5)
HEMOGLOBIN: 14.7 G/DL (ref 13.5–17.5)
HYALINE CASTS: 1 /LPF (ref 0–8)
KETONES, URINE: NEGATIVE MG/DL
LEUKOCYTE ESTERASE, URINE: ABNORMAL
LYMPHOCYTES ABSOLUTE: 2.2 K/UL (ref 1–5.1)
LYMPHOCYTES RELATIVE PERCENT: 28 %
MCH RBC QN AUTO: 31.6 PG (ref 26–34)
MCHC RBC AUTO-ENTMCNC: 33.6 G/DL (ref 31–36)
MCV RBC AUTO: 93.9 FL (ref 80–100)
MICROSCOPIC EXAMINATION: YES
MONOCYTES ABSOLUTE: 0.7 K/UL (ref 0–1.3)
MONOCYTES RELATIVE PERCENT: 8.4 %
NEUTROPHILS ABSOLUTE: 4.7 K/UL (ref 1.7–7.7)
NEUTROPHILS RELATIVE PERCENT: 60.1 %
NITRITE, URINE: NEGATIVE
PDW BLD-RTO: 13.5 % (ref 12.4–15.4)
PH UA: 7 (ref 5–8)
PLATELET # BLD: 206 K/UL (ref 135–450)
PMV BLD AUTO: 7.3 FL (ref 5–10.5)
POTASSIUM SERPL-SCNC: 4.5 MMOL/L (ref 3.5–5.1)
PROTEIN UA: 30 MG/DL
RBC # BLD: 4.65 M/UL (ref 4.2–5.9)
RBC UA: 41 /HPF (ref 0–4)
SODIUM BLD-SCNC: 136 MMOL/L (ref 136–145)
SPECIFIC GRAVITY UA: 1.01 (ref 1–1.03)
URINE REFLEX TO CULTURE: YES
URINE TYPE: ABNORMAL
UROBILINOGEN, URINE: 0.2 E.U./DL
WBC # BLD: 7.7 K/UL (ref 4–11)
WBC UA: 12 /HPF (ref 0–5)

## 2022-05-09 PROCEDURE — 51798 US URINE CAPACITY MEASURE: CPT

## 2022-05-09 PROCEDURE — 6370000000 HC RX 637 (ALT 250 FOR IP): Performed by: PHYSICIAN ASSISTANT

## 2022-05-09 PROCEDURE — 80048 BASIC METABOLIC PNL TOTAL CA: CPT

## 2022-05-09 PROCEDURE — 99283 EMERGENCY DEPT VISIT LOW MDM: CPT

## 2022-05-09 PROCEDURE — 81001 URINALYSIS AUTO W/SCOPE: CPT

## 2022-05-09 PROCEDURE — 85025 COMPLETE CBC W/AUTO DIFF WBC: CPT

## 2022-05-09 PROCEDURE — 51702 INSERT TEMP BLADDER CATH: CPT

## 2022-05-09 PROCEDURE — 87086 URINE CULTURE/COLONY COUNT: CPT

## 2022-05-09 RX ORDER — CEFUROXIME AXETIL 250 MG/1
250 TABLET ORAL 2 TIMES DAILY
Qty: 14 TABLET | Refills: 0 | Status: SHIPPED | OUTPATIENT
Start: 2022-05-09 | End: 2022-05-16

## 2022-05-09 RX ORDER — CEFUROXIME AXETIL 250 MG/1
250 TABLET ORAL ONCE
Status: COMPLETED | OUTPATIENT
Start: 2022-05-09 | End: 2022-05-09

## 2022-05-09 RX ADMIN — CEFUROXIME AXETIL 250 MG: 250 TABLET ORAL at 21:30

## 2022-05-09 ASSESSMENT — PAIN SCALES - GENERAL: PAINLEVEL_OUTOF10: 9

## 2022-05-09 ASSESSMENT — PAIN - FUNCTIONAL ASSESSMENT: PAIN_FUNCTIONAL_ASSESSMENT: 0-10

## 2022-05-09 ASSESSMENT — ENCOUNTER SYMPTOMS
SHORTNESS OF BREATH: 0
VOMITING: 0
ABDOMINAL PAIN: 1
DIARRHEA: 0
NAUSEA: 0
RHINORRHEA: 0
COUGH: 0

## 2022-05-10 LAB — URINE CULTURE, ROUTINE: NORMAL

## 2022-05-10 NOTE — ED PROVIDER NOTES
[facility]        Pt Name: Hallie Gamble  MRN: [id number]  [place] 1960  Date of evaluation: 5/9/2022  Provider: Tressa Alfonso PA-C  PCP: No primary care provider on file. Note Started: 8:09 PM EDT       CYNTHIA. I have evaluated this patient. My supervising physician was available for consultation. CHIEF COMPLAINT       Chief Complaint   Patient presents with    Urinary Retention     Pt states he had a prostate biopsy this am and had catheter removed after procedure; now has not urinated since. HISTORY OF PRESENT ILLNESS   (Location, Timing/Onset, Context/Setting, Quality, Duration, Modifying Factors, Severity, Associated Signs and Symptoms)  Note limiting factors. Chief Complaint: Urinary retention    Hallie Gamble is a 57 y.o. male who presents to the emergency department today for evaluation for urinary retention. The patient states that he had a kidney stone, and he states he did have lithotripsy. The patient states that while he did the CT scan they did notice that his prostate was enlarged. Patient states that he actually had a prostate biopsy this morning, and he states that he removed the catheter. Patient states since removing the catheter this morning he states he has not really been able to urinate. He states that he is just being the \"dribbles of blood\" out. Patient is reporting pain to his lower abdomen and feels that his bladder is distended. He is currently rating his discomfort as a 9/10, he states that his pain is worse if he sits down, and seems to improve if he stands up straight. He otherwise denies any known alleviating or grading factors. Patient has not had any fever chills per no cough or congestion. No nausea, vomiting or diarrhea. He denies any chest pain or shortness of breath.   Patient otherwise has no other complaints    Nursing Notes were all reviewed and agreed with or any disagreements were addressed in the HPI. REVIEW OF SYSTEMS    (2-9 systems for level 4, 10 or more for level 5)     Review of Systems   Constitutional: Negative for activity change, appetite change, chills and fever. HENT: Negative for congestion and rhinorrhea. Respiratory: Negative for cough and shortness of breath. Cardiovascular: Negative for chest pain. Gastrointestinal: Positive for abdominal pain. Negative for diarrhea, nausea and vomiting. Genitourinary: Positive for difficulty urinating. Negative for dysuria and hematuria. Positives and Pertinent negatives as per HPI. Except as noted above in the ROS, all other systems were reviewed and negative. PAST MEDICAL HISTORY   No past medical history on file. SURGICAL HISTORY     Past Surgical History:   Procedure Laterality Date    CYSTOSCOPY Right 4/19/2022    CYSTOSCOPY, HOLMIUM LASER LITHOTRIPSY OF BLADDER STONE performed by Carol Kathleen MD at [address] [facility]       Discharge Medication List as of 5/9/2022  9:32 PM      CONTINUE these medications which have NOT CHANGED    Details   tamsulosin (FLOMAX) 0.4 MG capsule Take 1 capsule by mouth daily, Disp-30 capsule, R-3Normal               ALLERGIES     Patient has no known allergies. FAMILYHISTORY     No family history on file.        SOCIAL HISTORY       Social History     Tobacco Use    Smoking status: Never Smoker    Smokeless tobacco: Never Used   Vaping Use    Vaping Use: Never used   Substance Use Topics    Alcohol use: Not Currently    Drug use: Not Currently       SCREENINGS    Marie Coma Scale  Eye Opening: Spontaneous  Best Verbal Response: Oriented  Best Motor Response: Obeys commands  [place] Coma Scale Score: 15        PHYSICAL EXAM    (up to 7 for level 4, 8 or more for level 5)     ED Triage Vitals [05/09/22 1945]   BP Temp Temp Source Pulse Resp SpO2 Height Weight   (!) 143/94 98.5 °F (36.9 °C) Oral 99 18 97 % -- --       Physical Exam  Vitals and nursing note reviewed. Constitutional:       Appearance: He is well-developed. He is not diaphoretic. HENT:      Head: Normocephalic and atraumatic. Right Ear: External ear normal.      Left Ear: External ear normal.      Nose: Nose normal.   Eyes:      General:         Right eye: No discharge. Left eye: No discharge. Neck:      Trachea: No tracheal deviation. Cardiovascular:      Rate and Rhythm: Normal rate and regular rhythm. Heart sounds: No murmur heard. Pulmonary:      Effort: Pulmonary effort is normal. No respiratory distress. Breath sounds: Normal breath sounds. No wheezing or rales. Abdominal:      General: Bowel sounds are normal. There is distension. Palpations: Abdomen is soft. Tenderness: There is abdominal tenderness. There is no guarding. Comments: There is tenderness, and distention in the suprapubic abdomen   Musculoskeletal:         General: Normal range of motion. Cervical back: Normal range of motion and neck supple. Skin:     General: Skin is warm and dry. Neurological:      Mental Status: He is alert and oriented to person, place, and time. Psychiatric:         Behavior: Behavior normal.         DIAGNOSTIC RESULTS   LABS:    Labs Reviewed   BASIC METABOLIC PANEL - Abnormal; Notable for the following components:       Result Value    Glucose 100 (*)     All other components within normal limits   URINALYSIS WITH REFLEX TO CULTURE - Abnormal; Notable for the following components:    Blood, Urine LARGE (*)     Protein, UA 30 (*)     Leukocyte Esterase, Urine TRACE (*)     All other components within normal limits   MICROSCOPIC URINALYSIS - Abnormal; Notable for the following components:    WBC, UA 12 (*)     RBC, UA 41 (*)     All other components within normal limits   CULTURE, URINE   CBC WITH AUTO DIFFERENTIAL       When ordered only abnormal lab results are displayed.  All other labs were within normal range or not returned as of this dictation. EKG: When ordered, EKG's are interpreted by the Emergency Department Physician in the absence of a cardiologist.  Please see their note for interpretation of EKG. RADIOLOGY:   Non-plain film images such as CT, Ultrasound and MRI are read by the radiologist. Plain radiographic images are visualized and preliminarily interpreted by the ED Provider with the below findings:        Interpretation per the Radiologist below, if available at the time of this note:    No orders to display     No results found. PROCEDURES   Unless otherwise noted below, none     Procedures    CRITICAL CARE TIME       CONSULTS:  None      EMERGENCY DEPARTMENT COURSE and DIFFERENTIAL DIAGNOSIS/MDM:   Vitals:    Vitals:    05/09/22 1945 05/09/22 2045 05/09/22 2100   BP: (!) 143/94 (!) 146/95 (!) 145/89   Pulse: 99     Resp: 18     Temp: 98.5 °F (36.9 °C)     TempSrc: Oral     SpO2: 97% 98% 100%       Patient was given the following medications:  Medications   cefUROXime (CEFTIN) tablet 250 mg (250 mg Oral Given 5/9/22 2130)           Briefly, this is a 78-year-old male who presents to the emergency department today for evaluation for urinary retention. Patient had a prostate biopsy this morning, and upon removing the catheter he continues to have difficulty with urinating. On examination, he does have tenderness, distention noted over the suprapubic abdomen, otherwise is unremarkable    Bladder scanned obtained which does show greater than 1000 mL, Beltrán catheter was placed and over 1200 mL have been removed. Patient has had complete resolution of his symptoms    CBC shows no evidence of leukocytosis or anemia. BMP is unremarkable. Urine does show trace leukocytes and 12 white blood cells, likely contamination with blood, however did have recent prostate biopsy therefore will give first dose of Ceftin here, and will give Ceftin for home.   I recommended close follow-up with urology within 2 to 3 days. Will leave catheter in place. He is to return to the ED for any new or worsening symptoms. The patient was understanding agreeable plan. Stable for discharge.    Results for orders placed or performed during the hospital encounter of 05/09/22   CBC with Auto Differential   Result Value Ref Range    WBC 7.7 4.0 - 11.0 K/uL    RBC 4.65 4.20 - 5.90 M/uL    Hemoglobin 14.7 13.5 - 17.5 g/dL    Hematocrit 43.7 40.5 - 52.5 %    MCV 93.9 80.0 - 100.0 fL    MCH 31.6 26.0 - 34.0 pg    MCHC 33.6 31.0 - 36.0 g/dL    RDW 13.5 12.4 - 15.4 %    Platelets 471 685 - 707 K/uL    MPV 7.3 5.0 - 10.5 fL    Neutrophils % 60.1 %    Lymphocytes % 28.0 %    Monocytes % 8.4 %    Eosinophils % 2.8 %    Basophils % 0.7 %    Neutrophils Absolute 4.7 1.7 - 7.7 K/uL    Lymphocytes Absolute 2.2 1.0 - 5.1 K/uL    Monocytes Absolute 0.7 0.0 - 1.3 K/uL    Eosinophils Absolute 0.2 0.0 - 0.6 K/uL    Basophils Absolute 0.1 0.0 - 0.2 K/uL   Basic Metabolic Panel   Result Value Ref Range    Sodium 136 136 - 145 mmol/L    Potassium 4.5 3.5 - 5.1 mmol/L    Chloride 102 99 - 110 mmol/L    CO2 24 21 - 32 mmol/L    Anion Gap 10 3 - 16    Glucose 100 (H) 70 - 99 mg/dL    BUN 11 7 - 20 mg/dL    CREATININE 0.9 0.8 - 1.3 mg/dL    GFR Non-African American >60 >60    GFR African American >60 >60    Calcium 9.8 8.3 - 10.6 mg/dL   Urinalysis with Reflex to Culture    Specimen: Urine   Result Value Ref Range    Color, UA Yellow Straw/Yellow    Clarity, UA Clear Clear    Glucose, Ur Negative Negative mg/dL    Bilirubin Urine Negative Negative    Ketones, Urine Negative Negative mg/dL    Specific Gravity, UA 1.010 1.005 - 1.030    Blood, Urine LARGE (A) Negative    pH, UA 7.0 5.0 - 8.0    Protein, UA 30 (A) Negative mg/dL    Urobilinogen, Urine 0.2 <2.0 E.U./dL    Nitrite, Urine Negative Negative    Leukocyte Esterase, Urine TRACE (A) Negative    Microscopic Examination YES     Urine Type NotGiven     Urine Reflex to Culture Yes Microscopic Urinalysis   Result Value Ref Range    Bacteria, UA None Seen None Seen /HPF    Hyaline Casts, UA 1 0 - 8 /LPF    WBC, UA 12 (H) 0 - 5 /HPF    RBC, UA 41 (H) 0 - 4 /HPF    Epithelial Cells, UA 1 0 - 5 /HPF         I estimate there is LOW risk acute appendicitis, acute cholecystitis, cholangitis, pancreatitis, diverticulitis, perforated viscus, perforated ulcer, colitis, C. diff colitis, ischemic colitis, bowel obstruction, acute dissection, thus I consider the discharge disposition reasonable. Also, there is no evidence or peritonitis, sepsis, or toxicity. Ruben Wei and I have discussed the diagnosis and risks, and we agree with discharging home to follow-up with their primary doctor. We also discussed returning to the Emergency Department immediately if new or worsening symptoms occur. We have discussed the symptoms which are most concerning (e.g., bloody stool, fever, changing or worsening pain, vomiting) that necessitate immediate return. FINAL IMPRESSION      1. Urinary retention    2.  Acute cystitis with hematuria          DISPOSITION/PLAN   DISPOSITION Decision To Discharge 05/09/2022 09:32:20 PM      PATIENT REFERRED TO:  Chino Baker MD  [place] [address] [phone]    Schedule an appointment as soon as possible for a visit in 2 days      [facility] Emergency Department  [address] [place] [address]  [phone]    As needed, If symptoms worsen      DISCHARGE MEDICATIONS:  Discharge Medication List as of 5/9/2022  9:32 PM      START taking these medications    Details   cefUROXime (CEFTIN) 250 MG tablet Take 1 tablet by mouth 2 times daily for 7 days, Disp-14 tablet, R-0Normal             DISCONTINUED MEDICATIONS:  Discharge Medication List as of 5/9/2022  9:32 PM                 (Please note that portions of this note were completed with a voice recognition program.  Efforts were made to edit the dictations but occasionally words are mis-transcribed.)    Keila Win PA-C (electronically signed)            Keila Win PA-C  05/09/22 [id number]

## 2022-05-10 NOTE — ED NOTES
Pt bladder scanned Pt: Bladder Scan <400. Was unable to get a better or what I felt was a more accurate reading. 18 Fr Beltrán Catheter placed. Once Beltrán placed 1000 ml immediately drained into bag. Pt states he is already feeling better but still has discomfort to his penis.       Carina Pearson RN  05/09/22 2042

## 2022-06-19 ENCOUNTER — HOSPITAL ENCOUNTER (EMERGENCY)
Age: 62
Discharge: HOME | End: 2022-06-19

## 2022-06-19 VITALS
SYSTOLIC BLOOD PRESSURE: 135 MMHG | TEMPERATURE: 97.9 F | HEIGHT: 75 IN | DIASTOLIC BLOOD PRESSURE: 83 MMHG | RESPIRATION RATE: 16 BRPM | OXYGEN SATURATION: 95 % | BODY MASS INDEX: 25.56 KG/M2 | WEIGHT: 205.6 LBS | HEART RATE: 83 BPM

## 2022-06-19 DIAGNOSIS — N30.00 ACUTE CYSTITIS WITHOUT HEMATURIA: ICD-10-CM

## 2022-06-19 DIAGNOSIS — R33.9 RETENTION OF URINE: Primary | ICD-10-CM

## 2022-06-19 LAB
BACTERIA: ABNORMAL /HPF
BILIRUBIN URINE: NEGATIVE
BLOOD, URINE: NEGATIVE
CLARITY: CLEAR
COLOR: YELLOW
EPITHELIAL CELLS, UA: 1 /HPF (ref 0–5)
GLUCOSE URINE: NEGATIVE MG/DL
HYALINE CASTS: 1 /LPF (ref 0–8)
KETONES, URINE: NEGATIVE MG/DL
LEUKOCYTE ESTERASE, URINE: ABNORMAL
MICROSCOPIC EXAMINATION: YES
NITRITE, URINE: POSITIVE
PH UA: 7 (ref 5–8)
PROTEIN UA: 30 MG/DL
RBC UA: 1 /HPF (ref 0–4)
SPECIFIC GRAVITY UA: 1.01 (ref 1–1.03)
URINE REFLEX TO CULTURE: YES
URINE TYPE: ABNORMAL
UROBILINOGEN, URINE: 0.2 E.U./DL
WBC UA: 114 /HPF (ref 0–5)

## 2022-06-19 PROCEDURE — 51702 INSERT TEMP BLADDER CATH: CPT

## 2022-06-19 PROCEDURE — 81001 URINALYSIS AUTO W/SCOPE: CPT

## 2022-06-19 PROCEDURE — 51798 US URINE CAPACITY MEASURE: CPT

## 2022-06-19 PROCEDURE — 87086 URINE CULTURE/COLONY COUNT: CPT

## 2022-06-19 PROCEDURE — 99283 EMERGENCY DEPT VISIT LOW MDM: CPT

## 2022-06-19 RX ORDER — SULFAMETHOXAZOLE AND TRIMETHOPRIM 800; 160 MG/1; MG/1
1 TABLET ORAL 2 TIMES DAILY
Qty: 20 TABLET | Refills: 0 | Status: SHIPPED | OUTPATIENT
Start: 2022-06-19 | End: 2022-06-29

## 2022-06-19 ASSESSMENT — PAIN DESCRIPTION - LOCATION: LOCATION: ABDOMEN

## 2022-06-19 ASSESSMENT — ENCOUNTER SYMPTOMS
CONSTIPATION: 0
SHORTNESS OF BREATH: 0
COLOR CHANGE: 0
ABDOMINAL PAIN: 0
VOMITING: 0
DIARRHEA: 0
NAUSEA: 0
BACK PAIN: 0

## 2022-06-19 ASSESSMENT — PAIN SCALES - GENERAL: PAINLEVEL_OUTOF10: 10

## 2022-06-19 ASSESSMENT — PAIN DESCRIPTION - FREQUENCY: FREQUENCY: CONTINUOUS

## 2022-06-19 ASSESSMENT — PAIN DESCRIPTION - DESCRIPTORS: DESCRIPTORS: ACHING

## 2022-06-19 ASSESSMENT — PAIN DESCRIPTION - ORIENTATION: ORIENTATION: LOWER

## 2022-06-19 ASSESSMENT — PAIN - FUNCTIONAL ASSESSMENT: PAIN_FUNCTIONAL_ASSESSMENT: 0-10

## 2022-06-19 ASSESSMENT — PAIN DESCRIPTION - PAIN TYPE: TYPE: ACUTE PAIN

## 2022-06-19 NOTE — ED PROVIDER NOTES
[facility]        Pt Name: Barbi Fleming  MRN: [id number]  [place] 1960  Date of evaluation: 6/19/2022  Provider: Ying Sandhu PA-C  PCP: No primary care provider on file. Note Started: 8:28 AM EDT       CYNTHIA. I have evaluated this patient. My supervising physician was available for consultation. CHIEF COMPLAINT       Chief Complaint   Patient presents with    Other     Walk in pt c/o pain and trouble urinating after self removal of urinal catheter that was placed on 05/08/2022 in [facility]. HISTORY OF PRESENT ILLNESS   (Location, Timing/Onset, Context/Setting, Quality, Duration, Modifying Factors, Severity, Associated Signs and Symptoms)  Note limiting factors. Chief Complaint: Dysuria and difficulty urinating    Barbi Fleming is a 57 y.o. male who presents to the emergency department stating that since removing his urine catheter yesterday evening, patient has had difficulty urinating and burning with urination. He states that he has been dribbling urine this morning. He feels distended. He rates his discomfort to be a 10 out of 10. Denies flank or back pain. He states that he had urine catheter in the first place after he experienced a urine retention after recent lithotripsy procedure and prostate biopsy. He is scheduled for a robotic laparoscopic prostatectomy by Dr. Anabella King on 6/21/2022. He is requesting that we placed another catheter so he can empty his bladder. Nursing Notes were all reviewed and agreed with or any disagreements were addressed in the HPI. REVIEW OF SYSTEMS    (2-9 systems for level 4, 10 or more for level 5)     Review of Systems   Constitutional: Negative for chills and fever. HENT: Negative. Eyes: Negative for visual disturbance. Respiratory: Negative for shortness of breath. Cardiovascular: Negative for chest pain.    Gastrointestinal: Negative for abdominal pain, constipation, diarrhea, nausea and vomiting. Endocrine: Negative. Genitourinary: Positive for difficulty urinating, dysuria and urgency. Negative for decreased urine volume, flank pain, frequency, genital sores, hematuria, penile discharge, penile pain, penile swelling, scrotal swelling and testicular pain. Musculoskeletal: Negative for back pain, neck pain and neck stiffness. Skin: Negative for color change, pallor, rash and wound. Neurological: Negative. Psychiatric/Behavioral: Negative for confusion. All other systems reviewed and are negative. Positives and Pertinent negatives as per HPI. Except as noted above in the ROS, all other systems were reviewed and negative. PAST MEDICAL HISTORY   History reviewed. No pertinent past medical history. SURGICAL HISTORY     Past Surgical History:   Procedure Laterality Date    CYSTOSCOPY Right 4/19/2022    CYSTOSCOPY, HOLMIUM LASER LITHOTRIPSY OF BLADDER STONE performed by Quyen Marcial MD at [address] [facility]       Previous Medications    TAMSULOSIN (FLOMAX) 0.4 MG CAPSULE    Take 1 capsule by mouth daily         ALLERGIES     Patient has no known allergies. FAMILYHISTORY     History reviewed. No pertinent family history.        SOCIAL HISTORY       Social History     Tobacco Use    Smoking status: Current Some Day Smoker     Types: Cigars    Smokeless tobacco: Never Used   Vaping Use    Vaping Use: Never used   Substance Use Topics    Alcohol use: Not Currently    Drug use: Not Currently       SCREENINGS    [place] Coma Scale  Eye Opening: Spontaneous  Best Verbal Response: Oriented  Best Motor Response: Obeys commands  [place] Coma Scale Score: 15        PHYSICAL EXAM    (up to 7 for level 4, 8 or more for level 5)     ED Triage Vitals [06/19/22 0739]   BP Temp Temp Source Heart Rate Resp SpO2 Height Weight   135/83 97.9 °F (36.6 °C) Oral 83 16 95 % 6' 3\" (1.905 m) 205 lb 9.6 oz (93.3 kg)       Physical Exam  Vitals and nursing note reviewed. Constitutional:       Appearance: Normal appearance. He is well-developed. He is not toxic-appearing or diaphoretic. HENT:      Head: Normocephalic and atraumatic. Right Ear: External ear normal.      Left Ear: External ear normal.      Nose: Nose normal.      Mouth/Throat:      Mouth: Mucous membranes are moist.      Pharynx: Oropharynx is clear. Eyes:      General: No scleral icterus. Right eye: No discharge. Left eye: No discharge. Extraocular Movements: Extraocular movements intact. Conjunctiva/sclera: Conjunctivae normal.      Pupils: Pupils are equal, round, and reactive to light. Cardiovascular:      Rate and Rhythm: Normal rate. Pulmonary:      Effort: Pulmonary effort is normal.      Breath sounds: Normal breath sounds. Abdominal:      General: Bowel sounds are normal. There is no distension. Palpations: Abdomen is soft. Tenderness: There is no abdominal tenderness. There is no right CVA tenderness or left CVA tenderness. Genitourinary:     Pubic Area: No rash or pubic lice. Penis: Normal.       Testes: Normal. Cremasteric reflex is present. Epididymis:      Right: Normal.      Left: Normal.   Musculoskeletal:         General: Normal range of motion. Cervical back: Normal range of motion. Skin:     General: Skin is warm and dry. Capillary Refill: Capillary refill takes less than 2 seconds. Coloration: Skin is not jaundiced or pale. Findings: No bruising, erythema, lesion or rash. Neurological:      General: No focal deficit present. Mental Status: He is alert and oriented to person, place, and time.    Psychiatric:         Mood and Affect: Mood normal.         Behavior: Behavior normal.         DIAGNOSTIC RESULTS   LABS:    Labs Reviewed   URINALYSIS WITH REFLEX TO CULTURE - Abnormal; Notable for the following components:       Result Value Protein, UA 30 (*)     Nitrite, Urine POSITIVE (*)     Leukocyte Esterase, Urine MODERATE (*)     All other components within normal limits   MICROSCOPIC URINALYSIS - Abnormal; Notable for the following components:    Bacteria, UA 2+ (*)     WBC,  (*)     All other components within normal limits   CULTURE, URINE       When ordered only abnormal lab results are displayed. All other labs were within normal range or not returned as of this dictation. EKG: When ordered, EKG's are interpreted by the Emergency Department Physician in the absence of a cardiologist.  Please see their note for interpretation of EKG. RADIOLOGY:   Non-plain film images such as CT, Ultrasound and MRI are read by the radiologist. Plain radiographic images are visualized and preliminarily interpreted by the ED Provider with the below findings:        Interpretation per the Radiologist below, if available at the time of this note:    No orders to display     No results found. PROCEDURES   Unless otherwise noted below, none     Procedures    CRITICAL CARE TIME   n/a    CONSULTS:  None      EMERGENCY DEPARTMENT COURSE and DIFFERENTIAL DIAGNOSIS/MDM:   Vitals:    Vitals:    06/19/22 0739   BP: 135/83   Pulse: 83   Resp: 16   Temp: 97.9 °F (36.6 °C)   TempSrc: Oral   SpO2: 95%   Weight: 205 lb 9.6 oz (93.3 kg)   Height: 6' 3\" (1.905 m)       Patient was given the following medications:  Medications - No data to display      Is this patient to be included in the SEP-1 Core Measure due to severe sepsis or septic shock? No   Exclusion criteria - the patient is NOT to be included for SEP-1 Core Measure due to:  2+ SIRS criteria are not met    This patient presents complaining of dysuria and difficulty urinating. Abdomen is soft without any substantial tenderness or distention. However did get greater than 900 cc of urine out with a Beltrán catheter. Urinalysis reveals infection.   Patient placed on antibiotic and advised to follow-up with urologist this week. he was given return precautions. Vital stable here, and patient is stable for discharge. He was given smith catheter care instructions. My suspicion is low for acute surgical abdomen, obstruction, perforation, abscess, mesenteric ischemia, AAA, dissection, cholecystitis, cholangitis, pancreatitis, appendicitis, C. diff colitis, diverticulitis, volvulus, incarcerated hernia, necrotizing fasciitis, prostatitis, testicular torsion, epididymitis, varicocele, hydrocele, orchitis, incarcerated hernia, Tiana gangrene, pyelonephritis, perinephric abscess, kidney stone, urosepsis, fistula, intussusception, pyloric stenosis, JOE,  or other concerning pathology. We have addressed his concerns and expectations. FINAL IMPRESSION      1. Retention of urine    2.  Acute cystitis without hematuria          DISPOSITION/PLAN   DISPOSITION Decision To Discharge 06/19/2022 08:30:39 AM      PATIENT REFERRED TO:  Meche Lechuga MD  [facility].  [phone]      follow up with your urologist in 1-3 days    [facility] Emergency Department  [address] [place] [address]  [phone]    If symptoms worsen      DISCHARGE MEDICATIONS:  New Prescriptions    SULFAMETHOXAZOLE-TRIMETHOPRIM (BACTRIM DS;SEPTRA DS) 800-160 MG PER TABLET    Take 1 tablet by mouth 2 times daily for 10 days       DISCONTINUED MEDICATIONS:  Discontinued Medications    No medications on file              (Please note that portions of this note were completed with a voice recognition program.  Efforts were made to edit the dictations but occasionally words are mis-transcribed.)    Lauro Todd PA-C (electronically signed)           Lauro Todd PA-C  06/19/22 [id number]

## 2022-06-20 LAB — URINE CULTURE, ROUTINE: NORMAL

## 2022-08-07 ENCOUNTER — HOSPITAL ENCOUNTER (EMERGENCY)
Age: 62
Discharge: HOME | End: 2022-08-07

## 2022-08-07 VITALS
HEART RATE: 60 BPM | SYSTOLIC BLOOD PRESSURE: 133 MMHG | DIASTOLIC BLOOD PRESSURE: 89 MMHG | TEMPERATURE: 98.6 F | WEIGHT: 220 LBS | BODY MASS INDEX: 27.35 KG/M2 | OXYGEN SATURATION: 98 % | RESPIRATION RATE: 16 BRPM | HEIGHT: 75 IN

## 2022-08-07 DIAGNOSIS — T83.9XXA PROBLEM WITH FOLEY CATHETER, INITIAL ENCOUNTER (HCC): Primary | ICD-10-CM

## 2022-08-07 PROCEDURE — 99283 EMERGENCY DEPT VISIT LOW MDM: CPT

## 2022-08-07 PROCEDURE — 51702 INSERT TEMP BLADDER CATH: CPT

## 2022-08-07 PROCEDURE — 6370000000 HC RX 637 (ALT 250 FOR IP)

## 2022-08-07 RX ORDER — LIDOCAINE HYDROCHLORIDE 20 MG/ML
JELLY TOPICAL
Status: COMPLETED
Start: 2022-08-07 | End: 2022-08-07

## 2022-08-07 RX ORDER — M-VIT,TX,IRON,MINS/CALC/FOLIC 27MG-0.4MG
1 TABLET ORAL DAILY
COMMUNITY

## 2022-08-07 RX ADMIN — LIDOCAINE HYDROCHLORIDE: 20 JELLY TOPICAL at 04:10

## 2022-08-07 ASSESSMENT — PAIN - FUNCTIONAL ASSESSMENT
PAIN_FUNCTIONAL_ASSESSMENT: NONE - DENIES PAIN
PAIN_FUNCTIONAL_ASSESSMENT: NONE - DENIES PAIN

## 2022-08-07 ASSESSMENT — ENCOUNTER SYMPTOMS
RHINORRHEA: 0
DIARRHEA: 0
SHORTNESS OF BREATH: 0
VOMITING: 0
COUGH: 0
ABDOMINAL PAIN: 0
NAUSEA: 0

## 2022-08-07 NOTE — ED PROVIDER NOTES
[facility]        Pt Name: Malen Dakins  MRN: [id number]  [place] 1960  Date of evaluation: 8/7/2022  Provider: Ras Mcknight PA-C  PCP: No primary care provider on file. Note Started: 6:37 PM EDT       CYNTHIA. I have evaluated this patient. My supervising physician was available for consultation. CHIEF COMPLAINT       Chief Complaint   Patient presents with    Urinary Catheter     From home. PT states he has not had his smith changed in about 30 days and is starting to have some bleeding around it. Wants it changed. HISTORY OF PRESENT ILLNESS   (Location, Timing/Onset, Context/Setting, Quality, Duration, Modifying Factors, Severity, Associated Signs and Symptoms)  Note limiting factors. Chief Complaint: Smith catheter problem    Malen Dakins is a 57 y.o. male who presents to the emergency department today for evaluation for a Smith catheter problem. The patient states that he has a indwelling Smith catheter, he states that he has had this in for the past several months. He states that he typically has it changed every 30 days however he states that he was out of town, and was unable to have it changed. The patient states that tonight he noticed some increasing irritation around his urethral meatus, and noticed a small amount of blood. Patient states he has not noticed any blood, or pain since, he states that he is here just to have it changed. Patient states that he has an indwelling Smith catheter in as he does have a history of an enlarged prostate, he states he is scheduled to have a prostatectomy performed within the next month. Followed by urology group. .  Patient has no fever or chills. He has no nausea or vomiting. No diarrhea. He has no chest pain. No shortness of breath. Patient otherwise has no other complaints at this time.     Nursing Notes were all reviewed and agreed with or any disagreements were addressed in the HPI. REVIEW OF SYSTEMS    (2-9 systems for level 4, 10 or more for level 5)     Review of Systems   Constitutional:  Negative for activity change, appetite change, chills and fever. HENT:  Negative for congestion and rhinorrhea. Respiratory:  Negative for cough and shortness of breath. Cardiovascular:  Negative for chest pain. Gastrointestinal:  Negative for abdominal pain, diarrhea, nausea and vomiting. Genitourinary:  Negative for difficulty urinating, dysuria and hematuria. Beltrán catheter problem, see HPI     Positives and Pertinent negatives as per HPI. Except as noted above in the ROS, all other systems were reviewed and negative. PAST MEDICAL HISTORY   History reviewed. No pertinent past medical history. SURGICAL HISTORY     Past Surgical History:   Procedure Laterality Date    CYSTOSCOPY Right 4/19/2022    CYSTOSCOPY, HOLMIUM LASER LITHOTRIPSY OF BLADDER STONE performed by Priyanka Tobin MD at [place] [address].       Discharge Medication List as of 8/7/2022  4:16 AM        CONTINUE these medications which have NOT CHANGED    Details   GINSENG PO Take by mouthHistorical Med      Multiple Vitamins-Minerals (THERAPEUTIC MULTIVITAMIN-MINERALS) tablet Take 1 tablet by mouth in the morning. Historical Med      UNABLE TO FIND \"Oreganol\"Historical Med      MAGNESIUM PO Take by mouthHistorical Med      tamsulosin (FLOMAX) 0.4 MG capsule Take 1 capsule by mouth daily, Disp-30 capsule, R-3Normal               ALLERGIES     Patient has no known allergies. FAMILYHISTORY     History reviewed. No pertinent family history.        SOCIAL HISTORY       Social History     Tobacco Use    Smoking status: Former     Types: Cigars    Smokeless tobacco: Never   Vaping Use    Vaping Use: Never used   Substance Use Topics    Alcohol use: Not Currently    Drug use: Not Currently       SCREENINGS    [place] Coma Scale  Eye Opening: Spontaneous  Best Verbal Response: Oriented  Best Motor Response: Obeys commands  [place] Coma Scale Score: 15        PHYSICAL EXAM    (up to 7 for level 4, 8 or more for level 5)     ED Triage Vitals [08/07/22 0301]   BP Temp Temp Source Heart Rate Resp SpO2 Height Weight   133/89 98.6 °F (37 °C) Oral 62 16 97 % 6' 3\" (1.905 m) 220 lb (99.8 kg)       Physical Exam  Vitals and nursing note reviewed. Constitutional:       Appearance: He is well-developed. He is not diaphoretic. HENT:      Head: Normocephalic and atraumatic. Right Ear: External ear normal.      Left Ear: External ear normal.      Nose: Nose normal.   Eyes:      General:         Right eye: No discharge. Left eye: No discharge. Neck:      Trachea: No tracheal deviation. Cardiovascular:      Rate and Rhythm: Normal rate and regular rhythm. Heart sounds: No murmur heard. Pulmonary:      Effort: Pulmonary effort is normal. No respiratory distress. Breath sounds: Normal breath sounds. No wheezing or rales. Abdominal:      General: Bowel sounds are normal. There is no distension. Palpations: Abdomen is soft. Tenderness: There is no abdominal tenderness. There is no guarding. Genitourinary:     Comments: Beltrán catheter in place no bleeding, irritation or abnormalities noted  Musculoskeletal:         General: Normal range of motion. Cervical back: Normal range of motion and neck supple. Skin:     General: Skin is warm and dry. Neurological:      Mental Status: He is alert and oriented to person, place, and time. Psychiatric:         Behavior: Behavior normal.       DIAGNOSTIC RESULTS   LABS:    Labs Reviewed - No data to display    When ordered only abnormal lab results are displayed. All other labs were within normal range or not returned as of this dictation. EKG:  When ordered, EKG's are interpreted by the Emergency Department Physician in the absence of a cardiologist.  Please see their note for interpretation of EKG. RADIOLOGY:   Non-plain film images such as CT, Ultrasound and MRI are read by the radiologist. Plain radiographic images are visualized and preliminarily interpreted by the ED Provider with the below findings:        Interpretation per the Radiologist below, if available at the time of this note:    No orders to display     No results found. PROCEDURES   Unless otherwise noted below, none     Procedures    CRITICAL CARE TIME       CONSULTS:  None      EMERGENCY DEPARTMENT COURSE and DIFFERENTIAL DIAGNOSIS/MDM:   Vitals:    Vitals:    08/07/22 0301 08/07/22 0419   BP: 133/89    Pulse: 62 60   Resp: 16 16   Temp: 98.6 °F (37 °C)    TempSrc: Oral    SpO2: 97% 98%   Weight: 220 lb (99.8 kg)    Height: 6' 3\" (1.905 m)        Patient was given the following medications:  Medications   lidocaine (XYLOCAINE) 2 % uro-jet (  Given 8/7/22 0410)         Is this patient to be included in the SEP-1 Core Measure due to severe sepsis or septic shock? No   Exclusion criteria - the patient is NOT to be included for SEP-1 Core Measure due to: Infection is not suspected    Briefly, this is a 68-year-old male who presents to the emergency department today for evaluation for Beltrán catheter problem. The patient states he has had a Beltrán catheter in for several months. He does have a history of an enlarged prostate, and states that he needs to have this changed every month. Patient was scheduled for prostatectomy at the end of the month. Patient states that he was out of town, was unable to have the Beltrán changed, he states that he simply here to have it changed. Otherwise has no other complaints. Physical exam, abdomen is benign, Beltrán catheter in place. No abnormalities noted. Vital signs stable. Beltrán catheter will be exchanged by RN, I recommend that he follow-up with his urologist.  Kristy Catrachito not feel that he needs any lab work, imaging as this would otherwise not .   Patient is otherwise agreeable plan, stable for discharge. No results found for this visit on 08/07/22. I estimate there is LOW risk acute appendicitis, acute cholecystitis, cholangitis, pancreatitis, diverticulitis, perforated viscus, perforated ulcer, colitis, C. diff colitis, ischemic colitis, bowel obstruction, acute dissection, thus I consider the discharge disposition reasonable. Also, there is no evidence or peritonitis, sepsis, or toxicity. Bobo Isaacs and I have discussed the diagnosis and risks, and we agree with discharging home to follow-up with their primary doctor. We also discussed returning to the Emergency Department immediately if new or worsening symptoms occur. We have discussed the symptoms which are most concerning (e.g., bloody stool, fever, changing or worsening pain, vomiting) that necessitate immediate return. FINAL IMPRESSION      1.  Problem with Beltrán catheter, initial encounter [facility])          DISPOSITION/PLAN   DISPOSITION Decision To Discharge 08/07/2022 04:00:58 AM      PATIENT REFERRED TO:  [facility]) Pre-Services  [phone]  Schedule an appointment as soon as possible for a visit in 2 days      [facility] Emergency Department  [address] [place] [address]  [phone]    As needed, If symptoms worsen      DISCHARGE MEDICATIONS:  Discharge Medication List as of 8/7/2022  4:16 AM          DISCONTINUED MEDICATIONS:  Discharge Medication List as of 8/7/2022  4:16 AM                 (Please note that portions of this note were completed with a voice recognition program.  Efforts were made to edit the dictations but occasionally words are mis-transcribed.)    Yael Cat PA-C (electronically signed)            Yael Cat PA-C  08/07/22 4692

## 2022-08-16 ENCOUNTER — HOSPITAL ENCOUNTER (EMERGENCY)
Age: 62
Discharge: HOME | End: 2022-08-16
Attending: EMERGENCY MEDICINE

## 2022-08-16 VITALS
DIASTOLIC BLOOD PRESSURE: 90 MMHG | OXYGEN SATURATION: 96 % | BODY MASS INDEX: 27.35 KG/M2 | WEIGHT: 220 LBS | TEMPERATURE: 98.2 F | SYSTOLIC BLOOD PRESSURE: 165 MMHG | HEIGHT: 75 IN | HEART RATE: 65 BPM | RESPIRATION RATE: 18 BRPM

## 2022-08-16 DIAGNOSIS — T83.9XXA FOLEY CATHETER PROBLEM, INITIAL ENCOUNTER (HCC): Primary | ICD-10-CM

## 2022-08-16 PROCEDURE — 51702 INSERT TEMP BLADDER CATH: CPT

## 2022-08-16 PROCEDURE — 99283 EMERGENCY DEPT VISIT LOW MDM: CPT

## 2022-08-16 PROCEDURE — 6370000000 HC RX 637 (ALT 250 FOR IP): Performed by: EMERGENCY MEDICINE

## 2022-08-16 RX ORDER — LIDOCAINE HYDROCHLORIDE 20 MG/ML
JELLY TOPICAL PRN
Status: DISCONTINUED | OUTPATIENT
Start: 2022-08-16 | End: 2022-08-16 | Stop reason: HOSPADM

## 2022-08-16 RX ORDER — LIDOCAINE HYDROCHLORIDE 20 MG/ML
JELLY TOPICAL ONCE
Status: DISCONTINUED | OUTPATIENT
Start: 2022-08-16 | End: 2022-08-16 | Stop reason: SDUPTHER

## 2022-08-16 RX ADMIN — LIDOCAINE HYDROCHLORIDE: 20 JELLY TOPICAL at 06:01

## 2022-08-16 ASSESSMENT — PAIN - FUNCTIONAL ASSESSMENT: PAIN_FUNCTIONAL_ASSESSMENT: NONE - DENIES PAIN

## 2022-08-16 NOTE — ED PROVIDER NOTES
EMERGENCY DEPARTMENT PROVIDER NOTE    Patient Identification  Pt Name: Ashley Claudio  MRN: [id number]  [place] 1960  Date of evaluation: 8/16/2022  Provider: Cristel Grissom DO  PCP: No primary care provider on file. Chief Complaint  Other (Pt was here last week and had his urinary cath replaced and states he is urinating around the catheter and has noticed some blood doesn't have a urology appt until Sept 7th )      HPI  (History provided by patient)  This is a 57 y.o. male with pertinent past medical history of prostatic hyperplasia who was brought in by self for Beltrán catheter trouble. Patient states that he has had a Beltrán catheter in place for the past several months, he had his Beltrán exchanged in the emergency department on 8/7/2022, initially seen to be working well however over the past day he has noticed decreased urine output into his bag and he is leaking urine from his urethra around the Beltrán tubing. Nothing seems to make the symptoms any better or worse. He denies any abdominal pain, flank pain or fevers. ROS    Const:  No fevers, no chills  Skin:  No rash, no lesions  Card:  No chest pain, no palpitations, no edema  Resp:  No shortness of breath, no cough, no wheezing  Abd:  No abdominal pain, no nausea, no vomiting, no diarrhea  Genitourinary:  No dysuria, +hematuria  MSK:  No joint pain, no myalgia      All other systems reviewed and negative unless otherwise noted in HPI      I have reviewed the following nursing documentation:  Allergies: Patient has no known allergies. Past medical history: History reviewed. No pertinent past medical history.   Past surgical history:   Past Surgical History:   Procedure Laterality Date    CYSTOSCOPY Right 4/19/2022    CYSTOSCOPY, HOLMIUM LASER LITHOTRIPSY OF BLADDER STONE performed by Areli Norris MD at [place] [address] medications:   Discharge Medication List as of 8/16/2022  6:03 AM        CONTINUE these medications which have NOT CHANGED    Details   GINSENG PO Take by mouthHistorical Med      Multiple Vitamins-Minerals (THERAPEUTIC MULTIVITAMIN-MINERALS) tablet Take 1 tablet by mouth in the morning. Historical Med      UNABLE TO FIND \"Oreganol\"Historical Med      MAGNESIUM PO Take by mouthHistorical Med      tamsulosin (FLOMAX) 0.4 MG capsule Take 1 capsule by mouth daily, Disp-30 capsule, R-3Normal             Social history:  reports that he has quit smoking. His smoking use included cigars. He has never used smokeless tobacco. He reports that he does not currently use alcohol. He reports that he does not currently use drugs. Family history:  History reviewed. No pertinent family history. Exam  ED Triage Vitals [08/16/22 0240]   BP Temp Temp Source Heart Rate Resp SpO2 Height Weight   (!) 165/90 98.2 °F (36.8 °C) Oral 65 18 96 % 6' 3\" (1.905 m) 220 lb (99.8 kg)     Nursing note and vitals reviewed. Constitutional: Well developed, well nourished. Non-toxic in appearance. Cardiovascular: RRR; no murmurs, rubs, or gallops. Pulmonary/Chest: Effort normal. No respiratory distress. CTAB. No stridor. No wheezes. No rales. Abdominal: Soft. No distension. Nontender to deep palpation all quadrants. No CVA tenderness. : Normal appearance of external genitalia. No blood noted at urethral meatus. Musculoskeletal: Moves all extremities. No gross deformity. Neurological: Alert and orientedx4. Face symmetric. Speech is clear. Skin: Warm and dry. No rash. Radiology  No orders to display       Labs  No results found for this visit on 08/16/22. Screenings   [place] Coma Scale  Eye Opening: Spontaneous  Best Verbal Response: Oriented  Best Motor Response: Obeys commands  [place] Coma Scale Score: 15        MDM and ED Course    Patient afebrile and nontoxic. No distress, overall well-appearing without signs to suggest systemic illness. Abdomen is benign.   Beltrán catheter was exchanged without complication and draining appropriately. Patient safe for discharge to self-care with close urology follow-up. Patient is agreeable with plan and feels comfortable returning to home. Return precautions discussed. I Dr. Vj Teran am the primary clinician of record. Final Impression  1. Beltrán catheter problem, initial encounter ([facility] [address]        Blood pressure (!) 165/90, pulse 65, temperature 98.2 °F (36.8 °C), temperature source Oral, resp. rate 18, height 6' 3\" (1.905 m), weight 220 lb (99.8 kg), SpO2 96 %. Disposition:  DISPOSITION Decision To Discharge 08/16/2022 06:04:44 AM      Patient Referrals:  [facility]  [phone]        Poppy Meng MD  [place] [address]  [facility] [address] [place] [address]  [phone]    In 1 week        Discharge Medications:  Discharge Medication List as of 8/16/2022  6:03 AM          Discontinued Medications:  Discharge Medication List as of 8/16/2022  6:03 AM          This chart was generated using the [address] [place] [address] dictation system. I created this record but it may contain dictation errors given the limitations of this technology.     Perez Sultana DO (electronically signed)  Attending Emergency Physician       Perez Sultana DO  08/16/22 [id number]

## 2022-09-27 ENCOUNTER — HOSPITAL ENCOUNTER (OUTPATIENT)
Age: 62
Discharge: HOME | End: 2022-09-27

## 2022-09-27 ENCOUNTER — HOSPITAL ENCOUNTER (OUTPATIENT)
Dept: GENERAL RADIOLOGY | Age: 62
Discharge: HOME | End: 2022-09-27

## 2022-09-27 DIAGNOSIS — Z01.818 PRE-OP TESTING: ICD-10-CM

## 2022-09-27 LAB
A/G RATIO: 1.5 (ref 1.1–2.2)
ABO/RH: NORMAL
ALBUMIN SERPL-MCNC: 4.3 G/DL (ref 3.4–5)
ALP BLD-CCNC: 64 U/L (ref 40–129)
ALT SERPL-CCNC: 32 U/L (ref 10–40)
ANION GAP SERPL CALCULATED.3IONS-SCNC: 14 MMOL/L (ref 3–16)
ANTIBODY SCREEN: NORMAL
APTT: 31.7 SEC (ref 23–34.3)
AST SERPL-CCNC: 29 U/L (ref 15–37)
BASOPHILS ABSOLUTE: 0 K/UL (ref 0–0.2)
BASOPHILS RELATIVE PERCENT: 0.5 %
BILIRUB SERPL-MCNC: 0.6 MG/DL (ref 0–1)
BUN BLDV-MCNC: 14 MG/DL (ref 7–20)
CALCIUM SERPL-MCNC: 10.1 MG/DL (ref 8.3–10.6)
CHLORIDE BLD-SCNC: 104 MMOL/L (ref 99–110)
CO2: 25 MMOL/L (ref 21–32)
CREAT SERPL-MCNC: 1 MG/DL (ref 0.8–1.3)
EKG ATRIAL RATE: 57 BPM
EKG DIAGNOSIS: NORMAL
EKG P AXIS: 50 DEGREES
EKG P-R INTERVAL: 162 MS
EKG Q-T INTERVAL: 386 MS
EKG QRS DURATION: 100 MS
EKG QTC CALCULATION (BAZETT): 375 MS
EKG R AXIS: 83 DEGREES
EKG T AXIS: 56 DEGREES
EKG VENTRICULAR RATE: 57 BPM
EOSINOPHILS ABSOLUTE: 0.2 K/UL (ref 0–0.6)
EOSINOPHILS RELATIVE PERCENT: 3.6 %
GFR AFRICAN AMERICAN: >60
GFR NON-AFRICAN AMERICAN: >60
GLUCOSE BLD-MCNC: 106 MG/DL (ref 70–99)
HCT VFR BLD CALC: 43 % (ref 40.5–52.5)
HEMOGLOBIN: 14.6 G/DL (ref 13.5–17.5)
INR BLD: 0.95 (ref 0.87–1.14)
LYMPHOCYTES ABSOLUTE: 1.8 K/UL (ref 1–5.1)
LYMPHOCYTES RELATIVE PERCENT: 30.2 %
MCH RBC QN AUTO: 31.8 PG (ref 26–34)
MCHC RBC AUTO-ENTMCNC: 33.9 G/DL (ref 31–36)
MCV RBC AUTO: 93.7 FL (ref 80–100)
MONOCYTES ABSOLUTE: 0.5 K/UL (ref 0–1.3)
MONOCYTES RELATIVE PERCENT: 7.8 %
NEUTROPHILS ABSOLUTE: 3.5 K/UL (ref 1.7–7.7)
NEUTROPHILS RELATIVE PERCENT: 57.9 %
PDW BLD-RTO: 13.2 % (ref 12.4–15.4)
PLATELET # BLD: 214 K/UL (ref 135–450)
PMV BLD AUTO: 8.4 FL (ref 5–10.5)
POTASSIUM SERPL-SCNC: 4.1 MMOL/L (ref 3.5–5.1)
PROTHROMBIN TIME: 12.5 SEC (ref 11.7–14.5)
RBC # BLD: 4.59 M/UL (ref 4.2–5.9)
SODIUM BLD-SCNC: 143 MMOL/L (ref 136–145)
TOTAL PROTEIN: 7.1 G/DL (ref 6.4–8.2)
WBC # BLD: 6 K/UL (ref 4–11)

## 2022-09-27 PROCEDURE — 86901 BLOOD TYPING SEROLOGIC RH(D): CPT

## 2022-09-27 PROCEDURE — 86900 BLOOD TYPING SEROLOGIC ABO: CPT

## 2022-09-27 PROCEDURE — 85610 PROTHROMBIN TIME: CPT

## 2022-09-27 PROCEDURE — 71046 X-RAY EXAM CHEST 2 VIEWS: CPT

## 2022-09-27 PROCEDURE — 80053 COMPREHEN METABOLIC PANEL: CPT

## 2022-09-27 PROCEDURE — 86850 RBC ANTIBODY SCREEN: CPT

## 2022-09-27 PROCEDURE — 36415 COLL VENOUS BLD VENIPUNCTURE: CPT

## 2022-09-27 PROCEDURE — 85025 COMPLETE CBC W/AUTO DIFF WBC: CPT

## 2022-09-27 PROCEDURE — 85730 THROMBOPLASTIN TIME PARTIAL: CPT

## 2022-09-27 PROCEDURE — 93005 ELECTROCARDIOGRAM TRACING: CPT

## 2022-11-08 ENCOUNTER — APPOINTMENT (OUTPATIENT)
Dept: GENERAL RADIOLOGY | Age: 62
End: 2022-11-08

## 2022-11-08 ENCOUNTER — HOSPITAL ENCOUNTER (EMERGENCY)
Age: 62
Discharge: HOME | End: 2022-11-09

## 2022-11-08 ENCOUNTER — APPOINTMENT (OUTPATIENT)
Dept: CT IMAGING | Age: 62
End: 2022-11-08

## 2022-11-08 VITALS
OXYGEN SATURATION: 97 % | RESPIRATION RATE: 16 BRPM | SYSTOLIC BLOOD PRESSURE: 124 MMHG | DIASTOLIC BLOOD PRESSURE: 87 MMHG | TEMPERATURE: 98.2 F | HEART RATE: 60 BPM

## 2022-11-08 DIAGNOSIS — N30.00 ACUTE CYSTITIS WITHOUT HEMATURIA: Primary | ICD-10-CM

## 2022-11-08 DIAGNOSIS — R33.9 URINARY RETENTION: ICD-10-CM

## 2022-11-08 LAB
A/G RATIO: 1.2 (ref 1.1–2.2)
ALBUMIN SERPL-MCNC: 4 G/DL (ref 3.4–5)
ALP BLD-CCNC: 62 U/L (ref 40–129)
ALT SERPL-CCNC: 40 U/L (ref 10–40)
ANION GAP SERPL CALCULATED.3IONS-SCNC: 10 MMOL/L (ref 3–16)
AST SERPL-CCNC: 37 U/L (ref 15–37)
BACTERIA: ABNORMAL /HPF
BASOPHILS ABSOLUTE: 0 K/UL (ref 0–0.2)
BASOPHILS RELATIVE PERCENT: 0.4 %
BILIRUB SERPL-MCNC: 0.7 MG/DL (ref 0–1)
BILIRUBIN URINE: NEGATIVE
BLOOD, URINE: ABNORMAL
BUN BLDV-MCNC: 10 MG/DL (ref 7–20)
CALCIUM SERPL-MCNC: 9.3 MG/DL (ref 8.3–10.6)
CHLORIDE BLD-SCNC: 104 MMOL/L (ref 99–110)
CLARITY: ABNORMAL
CO2: 22 MMOL/L (ref 21–32)
COLOR: YELLOW
CREAT SERPL-MCNC: 1 MG/DL (ref 0.8–1.3)
EOSINOPHILS ABSOLUTE: 0 K/UL (ref 0–0.6)
EOSINOPHILS RELATIVE PERCENT: 0.3 %
EPITHELIAL CELLS, UA: 0 /HPF (ref 0–5)
GFR SERPL CREATININE-BSD FRML MDRD: >60 ML/MIN/{1.73_M2}
GLUCOSE BLD-MCNC: 136 MG/DL (ref 70–99)
GLUCOSE URINE: NEGATIVE MG/DL
HCT VFR BLD CALC: 42.2 % (ref 40.5–52.5)
HEMOGLOBIN: 14.3 G/DL (ref 13.5–17.5)
HYALINE CASTS: 0 /LPF (ref 0–8)
KETONES, URINE: NEGATIVE MG/DL
LEUKOCYTE ESTERASE, URINE: ABNORMAL
LIPASE: 42 U/L (ref 13–60)
LYMPHOCYTES ABSOLUTE: 1 K/UL (ref 1–5.1)
LYMPHOCYTES RELATIVE PERCENT: 9.6 %
MCH RBC QN AUTO: 31.6 PG (ref 26–34)
MCHC RBC AUTO-ENTMCNC: 33.9 G/DL (ref 31–36)
MCV RBC AUTO: 93.4 FL (ref 80–100)
MICROSCOPIC EXAMINATION: YES
MONOCYTES ABSOLUTE: 0.4 K/UL (ref 0–1.3)
MONOCYTES RELATIVE PERCENT: 3.8 %
NEUTROPHILS ABSOLUTE: 8.6 K/UL (ref 1.7–7.7)
NEUTROPHILS RELATIVE PERCENT: 85.9 %
NITRITE, URINE: NEGATIVE
PDW BLD-RTO: 13.4 % (ref 12.4–15.4)
PH UA: 5.5 (ref 5–8)
PLATELET # BLD: 216 K/UL (ref 135–450)
PMV BLD AUTO: 7.5 FL (ref 5–10.5)
POTASSIUM REFLEX MAGNESIUM: 3.7 MMOL/L (ref 3.5–5.1)
PROTEIN UA: 100 MG/DL
RAPID INFLUENZA  B AGN: NEGATIVE
RAPID INFLUENZA A AGN: NEGATIVE
RBC # BLD: 4.51 M/UL (ref 4.2–5.9)
RBC UA: 1 /HPF (ref 0–4)
SODIUM BLD-SCNC: 136 MMOL/L (ref 136–145)
SPECIFIC GRAVITY UA: 1.01 (ref 1–1.03)
TOTAL PROTEIN: 7.4 G/DL (ref 6.4–8.2)
TROPONIN: <0.01 NG/ML
URINE REFLEX TO CULTURE: YES
URINE TYPE: ABNORMAL
UROBILINOGEN, URINE: 0.2 E.U./DL
WBC # BLD: 10.1 K/UL (ref 4–11)
WBC UA: 97 /HPF (ref 0–5)

## 2022-11-08 PROCEDURE — 96375 TX/PRO/DX INJ NEW DRUG ADDON: CPT

## 2022-11-08 PROCEDURE — 83690 ASSAY OF LIPASE: CPT

## 2022-11-08 PROCEDURE — 81001 URINALYSIS AUTO W/SCOPE: CPT

## 2022-11-08 PROCEDURE — 80053 COMPREHEN METABOLIC PANEL: CPT

## 2022-11-08 PROCEDURE — 87804 INFLUENZA ASSAY W/OPTIC: CPT

## 2022-11-08 PROCEDURE — 2580000003 HC RX 258: Performed by: PHYSICIAN ASSISTANT

## 2022-11-08 PROCEDURE — 84484 ASSAY OF TROPONIN QUANT: CPT

## 2022-11-08 PROCEDURE — 74177 CT ABD & PELVIS W/CONTRAST: CPT

## 2022-11-08 PROCEDURE — 87086 URINE CULTURE/COLONY COUNT: CPT

## 2022-11-08 PROCEDURE — 87186 SC STD MICRODIL/AGAR DIL: CPT

## 2022-11-08 PROCEDURE — 93005 ELECTROCARDIOGRAM TRACING: CPT | Performed by: PHYSICIAN ASSISTANT

## 2022-11-08 PROCEDURE — 99285 EMERGENCY DEPT VISIT HI MDM: CPT

## 2022-11-08 PROCEDURE — 71045 X-RAY EXAM CHEST 1 VIEW: CPT

## 2022-11-08 PROCEDURE — 6360000004 HC RX CONTRAST MEDICATION: Performed by: PHYSICIAN ASSISTANT

## 2022-11-08 PROCEDURE — 85025 COMPLETE CBC W/AUTO DIFF WBC: CPT

## 2022-11-08 PROCEDURE — 6360000002 HC RX W HCPCS: Performed by: PHYSICIAN ASSISTANT

## 2022-11-08 PROCEDURE — 36415 COLL VENOUS BLD VENIPUNCTURE: CPT

## 2022-11-08 PROCEDURE — 96365 THER/PROPH/DIAG IV INF INIT: CPT

## 2022-11-08 PROCEDURE — 87077 CULTURE AEROBIC IDENTIFY: CPT

## 2022-11-08 RX ORDER — CEFUROXIME AXETIL 250 MG/1
250 TABLET ORAL 2 TIMES DAILY
Qty: 14 TABLET | Refills: 0 | Status: SHIPPED | OUTPATIENT
Start: 2022-11-08 | End: 2022-11-15

## 2022-11-08 RX ORDER — ONDANSETRON 4 MG/1
4 TABLET, ORALLY DISINTEGRATING ORAL EVERY 8 HOURS PRN
Qty: 20 TABLET | Refills: 0 | Status: SHIPPED | OUTPATIENT
Start: 2022-11-08

## 2022-11-08 RX ORDER — ONDANSETRON 2 MG/ML
4 INJECTION INTRAMUSCULAR; INTRAVENOUS ONCE
Status: COMPLETED | OUTPATIENT
Start: 2022-11-08 | End: 2022-11-08

## 2022-11-08 RX ADMIN — IOPAMIDOL 75 ML: 755 INJECTION, SOLUTION INTRAVENOUS at 19:27

## 2022-11-08 RX ADMIN — CEFTRIAXONE SODIUM 1000 MG: 1 INJECTION, POWDER, FOR SOLUTION INTRAMUSCULAR; INTRAVENOUS at 22:31

## 2022-11-08 RX ADMIN — ONDANSETRON 4 MG: 2 INJECTION INTRAMUSCULAR; INTRAVENOUS at 22:29

## 2022-11-08 ASSESSMENT — ENCOUNTER SYMPTOMS
BACK PAIN: 0
SHORTNESS OF BREATH: 0
NAUSEA: 1
RESPIRATORY NEGATIVE: 1
CHEST TIGHTNESS: 0
VOMITING: 1
COLOR CHANGE: 0
COUGH: 0
DIARRHEA: 0
CONSTIPATION: 0
ABDOMINAL PAIN: 0

## 2022-11-08 ASSESSMENT — LIFESTYLE VARIABLES
HOW OFTEN DO YOU HAVE A DRINK CONTAINING ALCOHOL: NEVER
HOW MANY STANDARD DRINKS CONTAINING ALCOHOL DO YOU HAVE ON A TYPICAL DAY: PATIENT DOES NOT DRINK

## 2022-11-09 LAB
EKG ATRIAL RATE: 59 BPM
EKG DIAGNOSIS: NORMAL
EKG P AXIS: 40 DEGREES
EKG P-R INTERVAL: 166 MS
EKG Q-T INTERVAL: 422 MS
EKG QRS DURATION: 100 MS
EKG QTC CALCULATION (BAZETT): 417 MS
EKG R AXIS: 71 DEGREES
EKG T AXIS: 55 DEGREES
EKG VENTRICULAR RATE: 59 BPM

## 2022-11-09 NOTE — ED PROVIDER NOTES
[address] ENCOUNTER        Pt Name: Lorenso Lesches  MRN: [id number]  [place] 1960  Date of evaluation: 11/8/2022  Provider: GERMAN Edwards  PCP: No primary care provider on file. Note Started: 11:50 PM EST 11/8/2022        CYNTHIA. I have evaluated this patient. My supervising physician was available for consultation. CHIEF COMPLAINT       Chief Complaint   Patient presents with    Other     Has BPH, took catheter out yesterday, no normal urine output since       HISTORY OF PRESENT ILLNESS   (Location, Timing/Onset, Context/Setting, Quality, Duration, Modifying Factors, Severity, Associated Signs and Symptoms)  Note limiting factors. Chief Complaint: Urinary Retention     Lorenso Lesches is a 57 y.o. male with past medical history of prostate hyperplasia who presents the ED with complaint of urinary retention. Patient states he has history of prostate hyperplasia and states he did have a Beltrán catheter in. Patient states he took his Beltrán catheter out yesterday because he states he did not feel like he needed it anymore. Patient states he did deflate the balloon prior to point out. Patient states since yesterday has not had any urinary output. He states now he has some lower abdominal pain with associate distention and decreased urine output and is concerned he needs the Beltrán catheter replaced. He denies any hematuria or dysuria. He denies any fever or chills. He does report one episode of nausea and vomiting prior to arrival.  Denies chest pain, shortness of breath, headache, lightheadedness/dizziness, rashes/lesions, flank pain or back pain. Denies any changes in bowel movements. Nursing Notes were all reviewed and agreed with or any disagreements were addressed in the HPI.     REVIEW OF SYSTEMS    (2-9 systems for level 4, 10 or more for level 5)     Review of Systems   Constitutional:  Negative for activity change, appetite change, chills, diaphoresis, fatigue and fever. Respiratory: Negative. Negative for cough, chest tightness and shortness of breath. Cardiovascular: Negative. Negative for chest pain, palpitations and leg swelling. Gastrointestinal:  Positive for nausea and vomiting. Negative for abdominal pain, constipation and diarrhea. Genitourinary:  Positive for decreased urine volume and difficulty urinating. Negative for dysuria, flank pain, frequency, hematuria and urgency. Musculoskeletal:  Negative for arthralgias, back pain, myalgias, neck pain and neck stiffness. Skin:  Negative for color change, pallor, rash and wound. Neurological:  Negative for dizziness, weakness, light-headedness, numbness and headaches. Positives and Pertinent negatives as per HPI. Except as noted above in the ROS, all other systems were reviewed and negative. PAST MEDICAL HISTORY   History reviewed. No pertinent past medical history. SURGICAL HISTORY     Past Surgical History:   Procedure Laterality Date    CYSTOSCOPY Right 4/19/2022    CYSTOSCOPY, HOLMIUM LASER LITHOTRIPSY OF BLADDER STONE performed by David Durham MD at [place] [address].       Previous Medications    No medications on file         ALLERGIES     Patient has no known allergies. FAMILYHISTORY     History reviewed. No pertinent family history.        SOCIAL HISTORY       Social History     Tobacco Use    Smoking status: Former     Types: Cigars    Smokeless tobacco: Never   Vaping Use    Vaping Use: Never used   Substance Use Topics    Alcohol use: Not Currently    Drug use: Not Currently       SCREENINGS    Marie Coma Scale  Eye Opening: Spontaneous  Best Verbal Response: Oriented  Best Motor Response: Obeys commands  [place] Coma Scale Score: 15        PHYSICAL EXAM    (up to 7 for level 4, 8 or more for level 5)     ED Triage Vitals [11/08/22 1745]   BP Temp Temp src Heart Rate Resp SpO2 Height Weight   (!) 204/115 98.2 °F (36.8 °C) -- 60 16 97 % -- --       Physical Exam  Constitutional:       General: He is in acute distress. Appearance: Normal appearance. He is well-developed. He is not ill-appearing, toxic-appearing or diaphoretic. Comments: Appears uncomfortable. HENT:      Head: Normocephalic and atraumatic. Right Ear: External ear normal.      Left Ear: External ear normal.   Eyes:      General:         Right eye: No discharge. Left eye: No discharge. Conjunctiva/sclera: Conjunctivae normal.   Cardiovascular:      Rate and Rhythm: Normal rate and regular rhythm. Pulses: Normal pulses. Heart sounds: Normal heart sounds. No murmur heard. No friction rub. No gallop. Comments: 2+ radial pulses bilaterally. No pedal edema. No calf tenderness. No JVD. Pulmonary:      Effort: Pulmonary effort is normal. No respiratory distress. Breath sounds: Normal breath sounds. No stridor. No wheezing, rhonchi or rales. Chest:      Chest wall: No tenderness. Abdominal:      General: Abdomen is flat. Bowel sounds are normal. There is no distension. Palpations: Abdomen is soft. There is no mass. Tenderness: There is abdominal tenderness in the suprapubic area. There is no right CVA tenderness, left CVA tenderness, guarding or rebound. Negative signs include Erwin's sign and McBurney's sign. Hernia: No hernia is present. Comments: Tenderness palpation to the lower abdomen with associated distention and what appears to be palpable bladder. Musculoskeletal:         General: Normal range of motion. Cervical back: Normal range of motion and neck supple. No rigidity or tenderness. Lymphadenopathy:      Cervical: No cervical adenopathy. Skin:     General: Skin is warm and dry. Coloration: Skin is not pale. Findings: No erythema or rash. Neurological:      Mental Status: He is alert and oriented to person, place, and time. Psychiatric:         Behavior: Behavior normal.       DIAGNOSTIC RESULTS   LABS:    Labs Reviewed   URINALYSIS WITH REFLEX TO CULTURE - Abnormal; Notable for the following components:       Result Value    Clarity, UA CLOUDY (*)     Blood, Urine TRACE (*)     Protein,  (*)     Leukocyte Esterase, Urine LARGE (*)     All other components within normal limits   MICROSCOPIC URINALYSIS - Abnormal; Notable for the following components:    Bacteria, UA 4+ (*)     WBC, UA 97 (*)     All other components within normal limits   CBC WITH AUTO DIFFERENTIAL - Abnormal; Notable for the following components:    Neutrophils Absolute 8.6 (*)     All other components within normal limits   COMPREHENSIVE METABOLIC PANEL W/ REFLEX TO MG FOR LOW K - Abnormal; Notable for the following components:    Glucose 136 (*)     All other components within normal limits   RAPID INFLUENZA A/B ANTIGENS   CULTURE, URINE   LIPASE   TROPONIN       When ordered only abnormal lab results are displayed. All other labs were within normal range or not returned as of this dictation. EKG: When ordered, EKG's are interpreted by the Emergency Department Physician in the absence of a cardiologist.  Please see their note for interpretation of EKG. RADIOLOGY:   Non-plain film images such as CT, Ultrasound and MRI are read by the radiologist. Plain radiographic images are visualized and preliminarily interpreted by the ED Provider with the below findings:        Interpretation per the Radiologist below, if available at the time of this note:    XR CHEST PORTABLE   Final Result   Some subsegmental atelectasis at the bases. Otherwise the lungs are clear. CT ABDOMEN PELVIS W IV CONTRAST Additional Contrast? None   Final Result   1. No acute findings within the abdomen or pelvis   2. Near complete interval resolution of the previously described right   hydronephrosis and hydroureter.   Minimal fullness of the right collecting   system is now present. 3. Abnormally enlarged prostate gland   4. Beltrán catheter within the nondistended urinary bladder   5. Scattered colonic diverticula, without evidence of diverticulitis   6. Multiple renal cysts noted bilaterally, with a 4.5 cm hemorrhagic or   proteinaceous debris filled cysts again noted on the left kidney. Cysts were   previously evaluated with renal ultrasound   7. CT findings consistent with chronic pancreatitis           CT ABDOMEN PELVIS W IV CONTRAST Additional Contrast? None    Result Date: 11/8/2022  EXAMINATION: CT OF THE ABDOMEN AND PELVIS WITH CONTRAST 11/8/2022 4:18 pm TECHNIQUE: CT of the abdomen and pelvis was performed with the administration of intravenous contrast. Multiplanar reformatted images are provided for review. Automated exposure control, iterative reconstruction, and/or weight based adjustment of the mA/kV was utilized to reduce the radiation dose to as low as reasonably achievable. COMPARISON: CT scan dated April 18, 2022 HISTORY: ORDERING SYSTEM PROVIDED HISTORY: abd pain - n/v TECHNOLOGIST PROVIDED HISTORY: Reason for exam:->abd pain - n/v Additional Contrast?->None Decision Support Exception - unselect if not a suspected or confirmed emergency medical condition->Emergency Medical Condition (MA) Reason for Exam: abd pain - n/v FINDINGS: Lower Chest: Patchy airspace disease is present within the right lung base, and may represent atelectasis or early pneumonia. No pleural effusion or pneumothorax is present. 3 mm nodule is again noted within the left lung base, image number 21, unchanged. No follow-up imaging is recommended. Organs: The liver, spleen, pancreas, and adrenal glands demonstrate no acute abnormality. Scattered pancreatic calcifications are present, consistent with chronic pancreatitis. The gallbladder surgically absent. Cortical cyst formation is again noted on the kidneys bilaterally.   A cyst with increased attenuation is again noted extending off the posterosuperior aspect of the left kidney, and measures approximately 4.5 cm in greatest dimension. This is similar compared to the prior study, and is consistent with a hemorrhagic or proteinaceous, debris-filled cyst.  Minimal fullness of the right collecting system is present, significantly improved compared to the prior study. Punctate bilateral intrarenal calculi are present. GI/Bowel: A small hiatal hernia is present. Small bowel appears normal without evidence of obstruction. The appendix is normal.  Scattered colonic diverticula are noted, without evidence of diverticulitis. Pelvis: A Beltrán catheter is within the nondistended urinary bladder. The prostate gland is abnormally enlarged. Peritoneum/Retroperitoneum: No free fluid or free air is present in the abdomen or pelvis. No aneurysm formation is present. No pathological adenopathy is present. Bones/Soft Tissues: Degenerative changes are again noted within the spine. No acute osseous abnormality is present. 1. No acute findings within the abdomen or pelvis 2. Near complete interval resolution of the previously described right hydronephrosis and hydroureter. Minimal fullness of the right collecting system is now present. 3. Abnormally enlarged prostate gland 4. Beltrán catheter within the nondistended urinary bladder 5. Scattered colonic diverticula, without evidence of diverticulitis 6. Multiple renal cysts noted bilaterally, with a 4.5 cm hemorrhagic or proteinaceous debris filled cysts again noted on the left kidney. Cysts were previously evaluated with renal ultrasound 7. CT findings consistent with chronic pancreatitis     XR CHEST PORTABLE    Result Date: 11/8/2022  EXAMINATION: ONE XRAY VIEW OF THE CHEST 11/8/2022 9:10 pm COMPARISON: 09/27/2022 HISTORY: ORDERING SYSTEM PROVIDED HISTORY: emesis TECHNOLOGIST PROVIDED HISTORY: Reason for exam:->emesis Reason for Exam: emesis FINDINGS: Subsegmental atelectasis in the lower lungs.   No pleural effusion or pneumothorax. The mid upper lungs are clear. Cardiac silhouette is not enlarged and no pulmonary vascular congestion. The seun are stable. No acute fractures are identified. Some subsegmental atelectasis at the bases. Otherwise the lungs are clear. PROCEDURES   Unless otherwise noted below, none     Procedures    CRITICAL CARE TIME       CONSULTS:  None      EMERGENCY DEPARTMENT COURSE and DIFFERENTIAL DIAGNOSIS/MDM:   Vitals:    Vitals:    11/08/22 1745 11/08/22 2100 11/08/22 2315   BP: (!) 204/115 (!) 140/88 124/87   Pulse: 60     Resp: 16     Temp: 98.2 °F (36.8 °C)     SpO2: 97% 98% 97%       Patient was given the following medications:  Medications   cefTRIAXone (ROCEPHIN) 1,000 mg in dextrose 5 % 50 mL IVPB mini-bag (0 mg IntraVENous Stopped 11/8/22 2321)   ondansetron (ZOFRAN) injection 4 mg (4 mg IntraVENous Given 11/8/22 2229)   iopamidol (ISOVUE-370) 76 % injection 75 mL (75 mLs IntraVENous Given 11/8/22 1927)         Is this patient to be included in the SEP-1 Core Measure due to severe sepsis or septic shock? No   Exclusion criteria - the patient is NOT to be included for SEP-1 Core Measure due to:  2+ SIRS criteria are not met    Patient is a 77-year-old male who presents to the ED with complaint of urinary retention. He has history of prostate hyperplasia. He had catheter in place until yesterday when he states he took it out on his own at home because he did not feel he needed anymore. Patient states he did completely deflate the balloon prior to pulling it out. Patient states he has not been able to urinate since he remove the catheter. He came to the ED for lower abdominal pain with associated tension and decreased urine output and concerned that he needs the Beltrán catheter replaced. Upon arrival his blood pressure is 204/115 which I believe is most likely secondary to discomfort and pain.   He appears to be in discomfort at this time and has palpable bladder on exam.  Bladder scan was not obtained but order for Beltrán catheter was placed. Beltrán catheter inserted and had immediate output of approximately 1.5 L of clear yellow urine. No blood was noted. Blood pressure significantly improved. After Beltrán catheter was placed and he was able to drain I did reevaluate patient and he states his abdominal pain has since subsided. He states he no longer has any abdominal pain or distention. He does admit to still having some nausea. He had a couple episodes of vomiting since catheter was placed. Given his continued nausea and vomiting did broaden work-up with blood work and CT imaging. He was given Zofran. He had no further vomiting after Zofran and states he feels much better upon repeat evaluation. His flu swab is negative. CBC showed normal white count, hemoglobin and platelets. CMP unremarkable with normal kidney function. Lipase was normal.  Troponin was normal.  EKG inter by attending. Urinalysis showed 4+ bacteria with 97 white blood cells concern for underlying acute cystitis. He was given Rocephin here in the emergency department. CT of the abdomen and pelvis showed no acute abnormality. There does appear to be some minimal fullness of the right collecting system now present but near complete resolution to previously described right hydronephrosis and hydroureter. Prostate gland enlarged which patient already knows of given history of underlying prostate hyperplasia. CT does show findings of chronic pancreatitis but he has normal lipase here in the emergency department and feels much better upon repeat evaluation. No further vomiting. Able to handle oral intake. Chest x-ray was clear. Believe he be safely discharged home. Will have close outpatient follow-up with PCP and urology. We will give prescription for antibiotic with Ceftin for home. Will give Zofran for nausea control. Close return precautions discussed. FINAL IMPRESSION      1.  Acute cystitis without hematuria    2. Urinary retention          DISPOSITION/PLAN   DISPOSITION Decision To Discharge 11/08/2022 10:06:06 PM      PATIENT REFERRED TO:  [facility] Emergency Department  [address] [facility]  [address] [place] [id number] [phone]  Go to   As needed, If symptoms worsen    Rochelle Cee MD  [facility]  [phone]    Schedule an appointment as soon as possible for a visit   For a Re-check in  3-5    days. DISCHARGE MEDICATIONS:  New Prescriptions    CEFUROXIME (CEFTIN) 250 MG TABLET    Take 1 tablet by mouth 2 times daily for 7 days    ONDANSETRON (ZOFRAN ODT) 4 MG DISINTEGRATING TABLET    Take 1 tablet by mouth every 8 hours as needed for Nausea       DISCONTINUED MEDICATIONS:  Discontinued Medications    GINSENG PO    Take by mouth    MAGNESIUM PO    Take by mouth    MULTIPLE VITAMINS-MINERALS (THERAPEUTIC MULTIVITAMIN-MINERALS) TABLET    Take 1 tablet by mouth in the morning.     TAMSULOSIN (FLOMAX) 0.4 MG CAPSULE    Take 1 capsule by mouth daily    UNABLE TO FIND    \"Oreganol\"              (Please note that portions of this note were completed with a voice recognition program.  Efforts were made to edit the dictations but occasionally words are mis-transcribed.)    GERMAN Sanford (electronically signed)          GERMAN Bach  11/08/22 0101

## 2022-11-09 NOTE — ED PROVIDER NOTES
This is an independent CYNTHIA patient encounter. I am available for consultation if needed. I did not perform a face-to-face evaluation of this patient and was not asked to see the patient. I was not made aware of any details of the patient's H&P or medical decision making but was asked to review and document this EKG. See my interpretation of the EKG below. I shared my findings and interpretation with the CYNTHIA for use in his/her independent management of this patient. See his/her note for details of the patient's history, physical, and all medical decision making.       The 12 lead EKG was interpreted by me as follows:  Rate: bradycardia with a rate of 59  Rhythm: sinus with sinus arrhythmia  Axis: normal  Intervals: normal RI, narrow QRS, normal QTc  ST segments: no ST elevations or depressions  T waves: no abnormal inversions  Non-specific T wave changes: not present  Prior EKG comparison: EKG dated 11/8/22 is not significantly different       Luisa Schwab MD  11/08/22 9425

## 2022-11-11 LAB
ORGANISM: ABNORMAL
URINE CULTURE, ROUTINE: ABNORMAL

## 2023-07-02 ENCOUNTER — HOSPITAL ENCOUNTER (EMERGENCY)
Age: 63
Discharge: HOME | End: 2023-07-02
Attending: EMERGENCY MEDICINE

## 2023-07-02 VITALS
SYSTOLIC BLOOD PRESSURE: 149 MMHG | TEMPERATURE: 97.8 F | WEIGHT: 195.55 LBS | HEIGHT: 74 IN | RESPIRATION RATE: 16 BRPM | HEART RATE: 90 BPM | DIASTOLIC BLOOD PRESSURE: 92 MMHG | BODY MASS INDEX: 25.1 KG/M2 | OXYGEN SATURATION: 99 %

## 2023-07-02 DIAGNOSIS — R33.8 ACUTE URINARY RETENTION: Primary | ICD-10-CM

## 2023-07-02 DIAGNOSIS — N30.00 ACUTE CYSTITIS WITHOUT HEMATURIA: ICD-10-CM

## 2023-07-02 LAB
AMORPH SED URNS QL MICRO: ABNORMAL /HPF
BILIRUB UR QL STRIP.AUTO: NEGATIVE
CLARITY UR: ABNORMAL
COLOR UR: YELLOW
EPI CELLS #/AREA URNS HPF: ABNORMAL /HPF (ref 0–5)
GLUCOSE UR STRIP.AUTO-MCNC: NEGATIVE MG/DL
HGB UR QL STRIP.AUTO: ABNORMAL
KETONES UR STRIP.AUTO-MCNC: NEGATIVE MG/DL
LEUKOCYTE ESTERASE UR QL STRIP.AUTO: ABNORMAL
MUCOUS THREADS #/AREA URNS LPF: ABNORMAL /LPF
NITRITE UR QL STRIP.AUTO: NEGATIVE
PH UR STRIP.AUTO: 6 [PH] (ref 5–8)
PROT UR STRIP.AUTO-MCNC: 30 MG/DL
RBC #/AREA URNS HPF: ABNORMAL /HPF (ref 0–4)
SP GR UR STRIP.AUTO: 1.02 (ref 1–1.03)
UA COMPLETE W REFLEX CULTURE PNL UR: YES
UA DIPSTICK W REFLEX MICRO PNL UR: YES
URN SPEC COLLECT METH UR: ABNORMAL
UROBILINOGEN UR STRIP-ACNC: 0.2 E.U./DL
WBC #/AREA URNS HPF: ABNORMAL /HPF (ref 0–5)

## 2023-07-02 PROCEDURE — 99283 EMERGENCY DEPT VISIT LOW MDM: CPT

## 2023-07-02 PROCEDURE — 87086 URINE CULTURE/COLONY COUNT: CPT

## 2023-07-02 PROCEDURE — 81001 URINALYSIS AUTO W/SCOPE: CPT

## 2023-07-02 PROCEDURE — 51702 INSERT TEMP BLADDER CATH: CPT

## 2023-07-02 PROCEDURE — 6370000000 HC RX 637 (ALT 250 FOR IP): Performed by: EMERGENCY MEDICINE

## 2023-07-02 PROCEDURE — 87181 SC STD AGAR DILUTION PER AGT: CPT

## 2023-07-02 PROCEDURE — 87186 SC STD MICRODIL/AGAR DIL: CPT

## 2023-07-02 PROCEDURE — 51798 US URINE CAPACITY MEASURE: CPT

## 2023-07-02 PROCEDURE — 87077 CULTURE AEROBIC IDENTIFY: CPT

## 2023-07-02 RX ORDER — CEFUROXIME AXETIL 250 MG/1
250 TABLET ORAL 2 TIMES DAILY
Qty: 14 TABLET | Refills: 0 | Status: SHIPPED | OUTPATIENT
Start: 2023-07-02 | End: 2023-07-09

## 2023-07-02 RX ORDER — CEFUROXIME AXETIL 250 MG/1
500 TABLET ORAL ONCE
Status: COMPLETED | OUTPATIENT
Start: 2023-07-02 | End: 2023-07-02

## 2023-07-02 RX ADMIN — CEFUROXIME AXETIL 500 MG: 250 TABLET ORAL at 19:50

## 2023-07-02 ASSESSMENT — PAIN - FUNCTIONAL ASSESSMENT
PAIN_FUNCTIONAL_ASSESSMENT: 0-10
PAIN_FUNCTIONAL_ASSESSMENT: NONE - DENIES PAIN

## 2023-07-02 ASSESSMENT — PAIN DESCRIPTION - ORIENTATION: ORIENTATION: LOWER

## 2023-07-02 ASSESSMENT — PAIN SCALES - GENERAL
PAINLEVEL_OUTOF10: 0
PAINLEVEL_OUTOF10: 5

## 2023-07-02 ASSESSMENT — PAIN DESCRIPTION - LOCATION: LOCATION: ABDOMEN;BACK

## 2023-07-02 ASSESSMENT — PAIN DESCRIPTION - DESCRIPTORS: DESCRIPTORS: ACHING

## 2023-07-05 NOTE — RESULT ENCOUNTER NOTE
Culture reviewed, and urine culture did grow Pseudomonas bacteria in addition to Enterococcus bacillus bacteria. The patient should continue Ceftin however should add ciprofloxacin 500 mg twice daily for 7 days, dispense 14 pills 0 refills as patient had multiple different types of bacteria grow out of his urine.

## 2023-07-06 LAB
BACTERIA UR CULT: ABNORMAL
BACTERIA UR CULT: ABNORMAL
ORGANISM: ABNORMAL
ORGANISM: ABNORMAL

## 2023-07-06 NOTE — RESULT ENCOUNTER NOTE
Culture reviewed, please contact patient and inform them of the results and call in a prescription for Cipro 500 mg by mouth 2 times daily for 10 days. Stop Ceftin.

## 2023-07-07 NOTE — RESULT ENCOUNTER NOTE
Patient's positive result has been appropriately evaluated by the provider pool. Patient was unable to be reached over the phone. Voicemail box not set up. Hopefully patient will have caller ID and will call back  Will await a return phone call. Will try to reach patient again tomorrow per protocol.

## 2023-07-08 NOTE — RESULT ENCOUNTER NOTE
Patient's positive result has been appropriately evaluated by the provider pool. Patient was unable to be reached over the phone. A voicemail was left with the patient. Will await a return phone call. Will try to reach patient again tomorrow per protocol.       LETTER SENT

## 2023-10-23 ENCOUNTER — HOSPITAL ENCOUNTER (EMERGENCY)
Age: 63
Discharge: HOME | End: 2023-10-23
Attending: EMERGENCY MEDICINE

## 2023-10-23 VITALS
SYSTOLIC BLOOD PRESSURE: 162 MMHG | OXYGEN SATURATION: 96 % | BODY MASS INDEX: 24.83 KG/M2 | WEIGHT: 193.5 LBS | HEIGHT: 74 IN | TEMPERATURE: 98.6 F | DIASTOLIC BLOOD PRESSURE: 86 MMHG | RESPIRATION RATE: 16 BRPM | HEART RATE: 88 BPM

## 2023-10-23 DIAGNOSIS — R33.9 URINARY RETENTION: Primary | ICD-10-CM

## 2023-10-23 DIAGNOSIS — N39.0 URINARY TRACT INFECTION IN MALE: ICD-10-CM

## 2023-10-23 LAB
BACTERIA URNS QL MICRO: ABNORMAL /HPF
BILIRUB UR QL STRIP.AUTO: NEGATIVE
CLARITY UR: ABNORMAL
COLOR UR: YELLOW
EPI CELLS #/AREA URNS HPF: ABNORMAL /HPF (ref 0–5)
GLUCOSE UR STRIP.AUTO-MCNC: NEGATIVE MG/DL
HGB UR QL STRIP.AUTO: ABNORMAL
KETONES UR STRIP.AUTO-MCNC: NEGATIVE MG/DL
LEUKOCYTE ESTERASE UR QL STRIP.AUTO: ABNORMAL
NITRITE UR QL STRIP.AUTO: NEGATIVE
PH UR STRIP.AUTO: 6 [PH] (ref 5–8)
PROT UR STRIP.AUTO-MCNC: 100 MG/DL
RBC #/AREA URNS HPF: ABNORMAL /HPF (ref 0–4)
SP GR UR STRIP.AUTO: 1.01 (ref 1–1.03)
UA COMPLETE W REFLEX CULTURE PNL UR: YES
UA DIPSTICK W REFLEX MICRO PNL UR: YES
URN SPEC COLLECT METH UR: ABNORMAL
UROBILINOGEN UR STRIP-ACNC: 1 E.U./DL
WBC #/AREA URNS HPF: ABNORMAL /HPF (ref 0–5)

## 2023-10-23 PROCEDURE — 6370000000 HC RX 637 (ALT 250 FOR IP): Performed by: EMERGENCY MEDICINE

## 2023-10-23 PROCEDURE — 87086 URINE CULTURE/COLONY COUNT: CPT

## 2023-10-23 PROCEDURE — 51702 INSERT TEMP BLADDER CATH: CPT

## 2023-10-23 PROCEDURE — 99283 EMERGENCY DEPT VISIT LOW MDM: CPT

## 2023-10-23 PROCEDURE — 87181 SC STD AGAR DILUTION PER AGT: CPT

## 2023-10-23 PROCEDURE — 87077 CULTURE AEROBIC IDENTIFY: CPT

## 2023-10-23 PROCEDURE — 81001 URINALYSIS AUTO W/SCOPE: CPT

## 2023-10-23 PROCEDURE — 87186 SC STD MICRODIL/AGAR DIL: CPT

## 2023-10-23 RX ORDER — CEFUROXIME AXETIL 250 MG/1
250 TABLET ORAL 2 TIMES DAILY
Qty: 14 TABLET | Refills: 0 | Status: SHIPPED | OUTPATIENT
Start: 2023-10-23 | End: 2023-10-30

## 2023-10-23 RX ORDER — CEFUROXIME AXETIL 250 MG/1
250 TABLET ORAL ONCE
Status: COMPLETED | OUTPATIENT
Start: 2023-10-23 | End: 2023-10-23

## 2023-10-23 RX ADMIN — CEFUROXIME AXETIL 250 MG: 250 TABLET ORAL at 22:31

## 2023-10-23 ASSESSMENT — PATIENT HEALTH QUESTIONNAIRE - PHQ9
SUM OF ALL RESPONSES TO PHQ QUESTIONS 1-9: 0
SUM OF ALL RESPONSES TO PHQ9 QUESTIONS 1 & 2: 0
2. FEELING DOWN, DEPRESSED OR HOPELESS: 0
1. LITTLE INTEREST OR PLEASURE IN DOING THINGS: 0
SUM OF ALL RESPONSES TO PHQ QUESTIONS 1-9: 0

## 2023-10-23 ASSESSMENT — PAIN - FUNCTIONAL ASSESSMENT
PAIN_FUNCTIONAL_ASSESSMENT: PREVENTS OR INTERFERES SOME ACTIVE ACTIVITIES AND ADLS
PAIN_FUNCTIONAL_ASSESSMENT: 0-10
PAIN_FUNCTIONAL_ASSESSMENT: NONE - DENIES PAIN

## 2023-10-23 ASSESSMENT — PAIN SCALES - GENERAL: PAINLEVEL_OUTOF10: 9

## 2023-10-23 ASSESSMENT — PAIN DESCRIPTION - DIRECTION: RADIATING_TOWARDS: NON RADIATING

## 2023-10-23 ASSESSMENT — PAIN DESCRIPTION - FREQUENCY: FREQUENCY: CONTINUOUS

## 2023-10-23 ASSESSMENT — PAIN DESCRIPTION - ONSET: ONSET: PROGRESSIVE

## 2023-10-23 ASSESSMENT — PAIN DESCRIPTION - DESCRIPTORS: DESCRIPTORS: PRESSURE

## 2023-10-23 ASSESSMENT — PAIN DESCRIPTION - PAIN TYPE: TYPE: ACUTE PAIN

## 2023-10-24 NOTE — ED NOTES
Patient ambulated to room 4 and assisted into a hospital gown at this time.       Colonel Allan., RN  10/23/23 8321

## 2023-10-24 NOTE — ED NOTES
Patient ambulatory to Room 4 with c/o urinary retention. He reports he had a catheter inserted when he was here last for the same thing, but it \"fell out\" after 15 days. Did not follow up with urology at that time, states he was able to void, so decided he did not need to follow up. Patient non compliant with follow up, has cancelled numerous surgeries for his prostate. States he did not like the urologist he was seeing and couldn't afford the surgery. He is awake, alert, oriented, resps easy & regular, skin w/d, cap refill brisk.       Massiel Perez., RN  10/23/23 4149

## 2023-10-24 NOTE — ED NOTES
Patient's daughter at registration and states, \"He is in a lot of pain, I don't think you understand the danger of this situation. The last time he was seen his bladder almost burst\". Daughter assured that a room was being cleaned for patient at this time and he would be brought back to room as soon as possible.       Arash Villar., RN  10/23/23 4254

## 2023-10-24 NOTE — ED NOTES
Beltrán catheter inserted per hospital policy for urinary retention. Patient states he felt immediately better and now has no pain.       Pat Mckenna, RN  10/23/23 7637

## 2023-10-24 NOTE — ED NOTES
Dr. Bishop Wilhelm in room to discuss test results, diagnosis and discharge plan of care with patient.       Laura Copeland, RN  10/23/23 5414

## 2023-10-26 LAB
BACTERIA UR CULT: ABNORMAL
ORGANISM: ABNORMAL

## 2023-10-27 NOTE — RESULT ENCOUNTER NOTE
Urine Culture reviewed, positive for pseudomonas. Currently on ceftin. Discussion with pharmacy likely will not cover (not tested for susceptibility). Recommendation to switch to levaquin PO 500mg once daily for five days and follow up with primary care.

## 2024-01-28 ENCOUNTER — HOSPITAL ENCOUNTER (EMERGENCY)
Age: 64
Discharge: HOME | End: 2024-01-28
Attending: EMERGENCY MEDICINE

## 2024-01-28 VITALS
HEART RATE: 60 BPM | HEIGHT: 74 IN | RESPIRATION RATE: 18 BRPM | TEMPERATURE: 98.4 F | SYSTOLIC BLOOD PRESSURE: 136 MMHG | OXYGEN SATURATION: 97 % | BODY MASS INDEX: 24.16 KG/M2 | WEIGHT: 188.27 LBS | DIASTOLIC BLOOD PRESSURE: 88 MMHG

## 2024-01-28 DIAGNOSIS — N39.0 URINARY TRACT INFECTION ASSOCIATED WITH INDWELLING URETHRAL CATHETER, INITIAL ENCOUNTER (HCC): ICD-10-CM

## 2024-01-28 DIAGNOSIS — R33.9 URINARY RETENTION: Primary | ICD-10-CM

## 2024-01-28 DIAGNOSIS — T83.511A URINARY TRACT INFECTION ASSOCIATED WITH INDWELLING URETHRAL CATHETER, INITIAL ENCOUNTER (HCC): ICD-10-CM

## 2024-01-28 LAB
BACTERIA URNS QL MICRO: ABNORMAL /HPF
BILIRUB UR QL STRIP.AUTO: ABNORMAL
CLARITY UR: CLEAR
COLOR UR: YELLOW
EPI CELLS #/AREA URNS HPF: ABNORMAL /HPF (ref 0–5)
GLUCOSE UR STRIP.AUTO-MCNC: NEGATIVE MG/DL
HGB UR QL STRIP.AUTO: ABNORMAL
KETONES UR STRIP.AUTO-MCNC: NEGATIVE MG/DL
LEUKOCYTE ESTERASE UR QL STRIP.AUTO: ABNORMAL
MUCOUS THREADS #/AREA URNS LPF: ABNORMAL /LPF
NITRITE UR QL STRIP.AUTO: NEGATIVE
PH UR STRIP.AUTO: 6 [PH] (ref 5–8)
PROT UR STRIP.AUTO-MCNC: 100 MG/DL
RBC #/AREA URNS HPF: ABNORMAL /HPF (ref 0–4)
SP GR UR STRIP.AUTO: >=1.03 (ref 1–1.03)
UA DIPSTICK W REFLEX MICRO PNL UR: YES
URN SPEC COLLECT METH UR: ABNORMAL
UROBILINOGEN UR STRIP-ACNC: 0.2 E.U./DL
WBC #/AREA URNS HPF: ABNORMAL /HPF (ref 0–5)

## 2024-01-28 PROCEDURE — 99283 EMERGENCY DEPT VISIT LOW MDM: CPT

## 2024-01-28 PROCEDURE — 87077 CULTURE AEROBIC IDENTIFY: CPT

## 2024-01-28 PROCEDURE — 51702 INSERT TEMP BLADDER CATH: CPT

## 2024-01-28 PROCEDURE — 81001 URINALYSIS AUTO W/SCOPE: CPT

## 2024-01-28 PROCEDURE — 87186 SC STD MICRODIL/AGAR DIL: CPT

## 2024-01-28 PROCEDURE — 6370000000 HC RX 637 (ALT 250 FOR IP): Performed by: EMERGENCY MEDICINE

## 2024-01-28 PROCEDURE — 87086 URINE CULTURE/COLONY COUNT: CPT

## 2024-01-28 RX ORDER — LEVOFLOXACIN 750 MG/1
750 TABLET, FILM COATED ORAL DAILY
Qty: 6 TABLET | Refills: 0 | Status: SHIPPED | OUTPATIENT
Start: 2024-01-28 | End: 2024-02-03

## 2024-01-28 RX ORDER — LIDOCAINE HYDROCHLORIDE 20 MG/ML
JELLY TOPICAL PRN
Status: DISCONTINUED | OUTPATIENT
Start: 2024-01-28 | End: 2024-01-28 | Stop reason: HOSPADM

## 2024-01-28 RX ORDER — LEVOFLOXACIN 250 MG/1
750 TABLET, FILM COATED ORAL ONCE
Status: COMPLETED | OUTPATIENT
Start: 2024-01-28 | End: 2024-01-28

## 2024-01-28 RX ADMIN — LEVOFLOXACIN 750 MG: 250 TABLET, FILM COATED ORAL at 17:36

## 2024-01-28 RX ADMIN — LIDOCAINE HYDROCHLORIDE: 20 JELLY TOPICAL at 17:00

## 2024-01-28 ASSESSMENT — PAIN DESCRIPTION - PAIN TYPE: TYPE: ACUTE PAIN

## 2024-01-28 ASSESSMENT — PAIN SCALES - GENERAL
PAINLEVEL_OUTOF10: 4
PAINLEVEL_OUTOF10: 2

## 2024-01-28 ASSESSMENT — PAIN DESCRIPTION - DESCRIPTORS
DESCRIPTORS: ACHING
DESCRIPTORS: ACHING

## 2024-01-28 ASSESSMENT — PAIN - FUNCTIONAL ASSESSMENT
PAIN_FUNCTIONAL_ASSESSMENT: 0-10
PAIN_FUNCTIONAL_ASSESSMENT: PREVENTS OR INTERFERES SOME ACTIVE ACTIVITIES AND ADLS
PAIN_FUNCTIONAL_ASSESSMENT: 0-10

## 2024-01-28 ASSESSMENT — PAIN DESCRIPTION - LOCATION
LOCATION: BACK
LOCATION: BACK

## 2024-01-28 ASSESSMENT — PAIN DESCRIPTION - FREQUENCY: FREQUENCY: INTERMITTENT

## 2024-01-28 ASSESSMENT — LIFESTYLE VARIABLES: HOW OFTEN DO YOU HAVE A DRINK CONTAINING ALCOHOL: NEVER

## 2024-01-28 NOTE — DISCHARGE INSTRUCTIONS
Your prescription has been sent to "[facility]    Be sure to contact the clinic listed in your paperwork to arrange for a follow up visit.    If you have any new or worsening issues after going home don't hesitate to return here for reevaluation at any time 24/7!

## 2024-01-28 NOTE — ED TRIAGE NOTES
Patient has had an indwelling smith since his ED visit in October 2023.  He would us a cap on the end of the smith and drain it when he had to urinate.  Two days ago he removed the catheter.  Today he no longer could urinate and requesting a catheter insertion again.  Patient pacing because of the discomfort.  Spoke with Dr. Perdomo and catheter inserted upon arrival to room 11.

## 2024-01-28 NOTE — ED NOTES
Patient wants a standard smith bag to drain his catheter at this time.  Leg bag given to him, along with another leg securing device.  Two additional catheter plugs given to him, per AJAY Perdomo.  Discharge in process.

## 2024-01-30 ASSESSMENT — ENCOUNTER SYMPTOMS
ABDOMINAL PAIN: 1
NAUSEA: 0
CONSTIPATION: 0
SHORTNESS OF BREATH: 0
BACK PAIN: 0
VOMITING: 0

## 2024-01-30 NOTE — ED PROVIDER NOTES
[facility]    Name: Etienne Mccarty : 1960 MRN: [id number] Date of Service: 2024    Initial VS: BP: 136/88, Temp: 98.4 °F (36.9 °C), Pulse: 60, Respirations: 18, SpO2: 97 %     CC: requesting catheter replacement    HPI: this patient is a 63 y.o. male presenting to the ED from home. Mr. Mccarty tells me that in late 2023 he had a urinary catheter placed for treatment of urinary retention. He has not had any healthcare follow up since then. His catheter had become quite \"gross\" in appearance so about 36 hours ago he removed the catheter on his own. Since then he has been able to urinate very little on his own. He has had increasing, pressure-like pain in the center of his lower abdomen. When he is able to void trace amounts of urine he also experiences burning urethral pain. He was concerned by this constellation of symptoms and he wanted to have another catheter placed so he came here this afternoon to be evaluated. He has not appreciated other changes from his usual state of health and he denies other current complaints.  _____________________________________________________________________    Past Medical History:   Diagnosis Date    Enlarged prostate     Kidney stones     Recurrent UTI      Past Surgical History:   Procedure Laterality Date    CYSTOSCOPY Right 2022    CYSTOSCOPY, HOLMIUM LASER LITHOTRIPSY OF BLADDER STONE performed by Raoul Mendoza MD at [facility] OR    LITHOTRIPSY       Social History     Tobacco Use    Smoking status: Former    Smokeless tobacco: Current     Types: Snuff   Vaping Use    Vaping Use: Never used   Substance Use Topics    Alcohol use: Not Currently    Drug use: Yes     Types: Marijuana (Weed)     History reviewed. No pertinent family history.  _____________________________________________________________________    Review of Systems   Constitutional:  Negative for chills, fatigue and fever.   Respiratory:  Negative for  1/28/2024  5:46 PM        START taking these medications    Details   levoFLOXacin (LEVAQUIN) 750 MG tablet Take 1 tablet by mouth daily for 6 days, Disp-6 tablet, R-0Normal           Clinical Impression(s)  1. Urinary retention    2. Urinary tract infection associated with indwelling urethral catheter, initial encounter ([facility])      Provider Signature: MD Conor Gutierrez Michael R, MD  01/30/24 6832

## 2024-01-31 LAB
BACTERIA UR CULT: ABNORMAL
ORGANISM: ABNORMAL

## 2024-03-11 ENCOUNTER — APPOINTMENT (OUTPATIENT)
Dept: CT IMAGING | Age: 64
End: 2024-03-11

## 2024-03-11 ENCOUNTER — HOSPITAL ENCOUNTER (EMERGENCY)
Age: 64
Discharge: HOME | End: 2024-03-11
Attending: INTERNAL MEDICINE

## 2024-03-11 VITALS
HEART RATE: 71 BPM | RESPIRATION RATE: 18 BRPM | OXYGEN SATURATION: 100 % | HEIGHT: 74 IN | WEIGHT: 200 LBS | BODY MASS INDEX: 25.67 KG/M2 | SYSTOLIC BLOOD PRESSURE: 180 MMHG | TEMPERATURE: 97.3 F | DIASTOLIC BLOOD PRESSURE: 88 MMHG

## 2024-03-11 DIAGNOSIS — N39.0 URINARY TRACT INFECTION ASSOCIATED WITH CATHETERIZATION OF URINARY TRACT, UNSPECIFIED INDWELLING URINARY CATHETER TYPE, INITIAL ENCOUNTER (HCC): ICD-10-CM

## 2024-03-11 DIAGNOSIS — R10.9 LEFT FLANK PAIN: Primary | ICD-10-CM

## 2024-03-11 DIAGNOSIS — T83.511A URINARY TRACT INFECTION ASSOCIATED WITH CATHETERIZATION OF URINARY TRACT, UNSPECIFIED INDWELLING URINARY CATHETER TYPE, INITIAL ENCOUNTER (HCC): ICD-10-CM

## 2024-03-11 DIAGNOSIS — N40.0 ENLARGED PROSTATE: ICD-10-CM

## 2024-03-11 LAB
ANION GAP SERPL CALCULATED.3IONS-SCNC: 9 MMOL/L (ref 3–16)
BACTERIA URNS QL MICRO: ABNORMAL /HPF
BASOPHILS # BLD: 0 K/UL (ref 0–0.2)
BASOPHILS NFR BLD: 0.2 %
BILIRUB UR QL STRIP.AUTO: NEGATIVE
BUN SERPL-MCNC: 17 MG/DL (ref 7–20)
CALCIUM SERPL-MCNC: 9.4 MG/DL (ref 8.3–10.6)
CHLORIDE SERPL-SCNC: 104 MMOL/L (ref 99–110)
CLARITY UR: CLEAR
CO2 SERPL-SCNC: 26 MMOL/L (ref 21–32)
COLOR UR: YELLOW
CREAT SERPL-MCNC: 1.1 MG/DL (ref 0.8–1.3)
DEPRECATED RDW RBC AUTO: 13.8 % (ref 12.4–15.4)
EOSINOPHIL # BLD: 0 K/UL (ref 0–0.6)
EOSINOPHIL NFR BLD: 0.2 %
EPI CELLS #/AREA URNS AUTO: 2 /HPF (ref 0–5)
GFR SERPLBLD CREATININE-BSD FMLA CKD-EPI: >60 ML/MIN/{1.73_M2}
GLUCOSE SERPL-MCNC: 131 MG/DL (ref 70–99)
GLUCOSE UR STRIP.AUTO-MCNC: NEGATIVE MG/DL
HCT VFR BLD AUTO: 43.6 % (ref 40.5–52.5)
HGB BLD-MCNC: 14.6 G/DL (ref 13.5–17.5)
HGB UR QL STRIP.AUTO: ABNORMAL
HYALINE CASTS #/AREA URNS AUTO: 0 /LPF (ref 0–8)
KETONES UR STRIP.AUTO-MCNC: NEGATIVE MG/DL
LEUKOCYTE ESTERASE UR QL STRIP.AUTO: ABNORMAL
LYMPHOCYTES # BLD: 0.8 K/UL (ref 1–5.1)
LYMPHOCYTES NFR BLD: 6.9 %
MCH RBC QN AUTO: 32 PG (ref 26–34)
MCHC RBC AUTO-ENTMCNC: 33.6 G/DL (ref 31–36)
MCV RBC AUTO: 95.4 FL (ref 80–100)
MONOCYTES # BLD: 0.4 K/UL (ref 0–1.3)
MONOCYTES NFR BLD: 3.5 %
NEUTROPHILS # BLD: 10.9 K/UL (ref 1.7–7.7)
NEUTROPHILS NFR BLD: 89.2 %
NITRITE UR QL STRIP.AUTO: NEGATIVE
PH UR STRIP.AUTO: 7 [PH] (ref 5–8)
PLATELET # BLD AUTO: 235 K/UL (ref 135–450)
PMV BLD AUTO: 7.3 FL (ref 5–10.5)
POTASSIUM SERPL-SCNC: 4.4 MMOL/L (ref 3.5–5.1)
PROT UR STRIP.AUTO-MCNC: 30 MG/DL
RBC # BLD AUTO: 4.57 M/UL (ref 4.2–5.9)
RBC CLUMPS #/AREA URNS AUTO: 12 /HPF (ref 0–4)
SODIUM SERPL-SCNC: 139 MMOL/L (ref 136–145)
SP GR UR STRIP.AUTO: 1.01 (ref 1–1.03)
UA COMPLETE W REFLEX CULTURE PNL UR: YES
UA DIPSTICK W REFLEX MICRO PNL UR: YES
URN SPEC COLLECT METH UR: ABNORMAL
UROBILINOGEN UR STRIP-ACNC: 1 E.U./DL
WBC # BLD AUTO: 12.3 K/UL (ref 4–11)
WBC #/AREA URNS AUTO: 88 /HPF (ref 0–5)

## 2024-03-11 PROCEDURE — 6360000004 HC RX CONTRAST MEDICATION: Performed by: INTERNAL MEDICINE

## 2024-03-11 PROCEDURE — 51701 INSERT BLADDER CATHETER: CPT

## 2024-03-11 PROCEDURE — 74177 CT ABD & PELVIS W/CONTRAST: CPT

## 2024-03-11 PROCEDURE — 87186 SC STD MICRODIL/AGAR DIL: CPT

## 2024-03-11 PROCEDURE — 36415 COLL VENOUS BLD VENIPUNCTURE: CPT

## 2024-03-11 PROCEDURE — 6370000000 HC RX 637 (ALT 250 FOR IP): Performed by: EMERGENCY MEDICINE

## 2024-03-11 PROCEDURE — 81001 URINALYSIS AUTO W/SCOPE: CPT

## 2024-03-11 PROCEDURE — 87086 URINE CULTURE/COLONY COUNT: CPT

## 2024-03-11 PROCEDURE — 85025 COMPLETE CBC W/AUTO DIFF WBC: CPT

## 2024-03-11 PROCEDURE — 99285 EMERGENCY DEPT VISIT HI MDM: CPT

## 2024-03-11 PROCEDURE — 80048 BASIC METABOLIC PNL TOTAL CA: CPT

## 2024-03-11 PROCEDURE — 51798 US URINE CAPACITY MEASURE: CPT

## 2024-03-11 PROCEDURE — 87077 CULTURE AEROBIC IDENTIFY: CPT

## 2024-03-11 RX ORDER — LEVOFLOXACIN 750 MG/1
750 TABLET, FILM COATED ORAL
Qty: 9 TABLET | Refills: 0 | Status: SHIPPED | OUTPATIENT
Start: 2024-03-11 | End: 2024-03-20

## 2024-03-11 RX ORDER — LEVOFLOXACIN 750 MG/1
750 TABLET, FILM COATED ORAL ONCE
Status: COMPLETED | OUTPATIENT
Start: 2024-03-11 | End: 2024-03-11

## 2024-03-11 RX ORDER — ONDANSETRON 2 MG/ML
4 INJECTION INTRAMUSCULAR; INTRAVENOUS ONCE
Status: DISCONTINUED | OUTPATIENT
Start: 2024-03-11 | End: 2024-03-11 | Stop reason: HOSPADM

## 2024-03-11 RX ORDER — FENTANYL CITRATE 50 UG/ML
25 INJECTION, SOLUTION INTRAMUSCULAR; INTRAVENOUS ONCE
Status: DISCONTINUED | OUTPATIENT
Start: 2024-03-11 | End: 2024-03-11 | Stop reason: HOSPADM

## 2024-03-11 RX ADMIN — IOPAMIDOL 75 ML: 755 INJECTION, SOLUTION INTRAVENOUS at 15:04

## 2024-03-11 RX ADMIN — LEVOFLOXACIN 750 MG: 750 TABLET, FILM COATED ORAL at 16:30

## 2024-03-11 ASSESSMENT — PAIN - FUNCTIONAL ASSESSMENT: PAIN_FUNCTIONAL_ASSESSMENT: 0-10

## 2024-03-11 ASSESSMENT — PAIN SCALES - GENERAL: PAINLEVEL_OUTOF10: 10

## 2024-03-11 NOTE — DISCHARGE INSTRUCTIONS
Your prescription has been sent to [facility].    Be sure to contact the clinic listed in your paperwork to arrange for a follow up visit.    If you have any new or worsening issues after going home don't hesitate to return here for reevaluation at any time 24/7!

## 2024-03-11 NOTE — ED NOTES
Upon discharge pt was provided education by this RN on proper smith care to prevent future infections. Pt acknowledged understanding of instructions, but chose to use a cap on the smith catheter rather than the leg bag as recommended.

## 2024-03-11 NOTE — ED NOTES
Smith catheter removed, bladder volume 289 mL via bladder scan. New smith catheter inserted. Lab specimen was sent from new smith catheter placement.

## 2024-03-12 NOTE — ED PROVIDER NOTES
EMERGENCY MEDICINE PROVIDER NOTE    Patient Identification  Pt Name: Etienne Mccarty  MRN: [id number]  Birthdate 1960  Date of evaluation: 3/11/2024  Provider: KRISTAN FAGAN DO  PCP: No primary care provider on file.    Chief Complaint  Flank Pain (Patient states has BPH and has a catheter, states yesterday catheter is clogged, endorsing L flank pain. )      HPI  (History provided by patient)  This is a 64 y.o. male who was brought in by self for left flank pain that started today along with decreased urinary output from his chronic indwelling Beltrán catheter.  Patient caps the Beltrán catheter off and does not use a bag.  Prior to coming to the ER, he started experiencing left flank pain.  Just prior to me seeing him he had a bowel movement and the flank pain improved.  He still has lower abdominal pain.  Family in the room states that the patient has had the Beltrán catheter for quite a while.  He used to follow with Dr. Mendoza with urology but due to financial reasons he did not follow-up.  Patient has also been having some nausea and vomiting.    I have reviewed the following nursing documentation:  Allergies: Patient has no known allergies.    Past medical history:   Past Medical History:   Diagnosis Date    Enlarged prostate     Kidney stones     Recurrent UTI      Past surgical history:   Past Surgical History:   Procedure Laterality Date    CYSTOSCOPY Right 4/19/2022    CYSTOSCOPY, HOLMIUM LASER LITHOTRIPSY OF BLADDER STONE performed by Raoul Mendoza MD at [facility] OR    LITHOTRIPSY         Home medications:   Discharge Medication List as of 3/11/2024  4:44 PM          Social history:  reports that he has quit smoking. His smokeless tobacco use includes snuff. He reports that he does not currently use alcohol. He reports current drug use. Drug: Marijuana (Weed).    Family history:  History reviewed. No pertinent family history.    Exam  ED Triage Vitals [03/11/24 1201]   BP Temp Temp Source Pulse 
medical providers as he has not had any outpatient management of his chronic medical conditions since 2022. Return precautions reviewed in detail and outpatient follow up advised.    Discharge Medication List as of 3/11/2024  4:44 PM        START taking these medications    Details   levoFLOXacin (LEVAQUIN) 750 MG tablet Take 1 tablet by mouth Daily with lunch for 9 days, Disp-9 tablet, R-0Normal           Clinical Impression(s)  1. Left flank pain    2. Enlarged prostate    3. Urinary tract infection associated with catheterization of urinary tract, unspecified indwelling urinary catheter type, initial encounter ([facility])      Provider Signature: MD Conor Gutierrez Michael R, MD  03/12/24 3499

## 2024-03-13 LAB
BACTERIA UR CULT: ABNORMAL
ORGANISM: ABNORMAL

## 2024-08-03 ENCOUNTER — HOSPITAL ENCOUNTER (EMERGENCY)
Age: 64
Discharge: HOME | End: 2024-08-03
Attending: EMERGENCY MEDICINE
Payer: MEDICAID

## 2024-08-03 VITALS
DIASTOLIC BLOOD PRESSURE: 81 MMHG | HEART RATE: 80 BPM | BODY MASS INDEX: 24.5 KG/M2 | WEIGHT: 190.92 LBS | TEMPERATURE: 98.2 F | RESPIRATION RATE: 16 BRPM | SYSTOLIC BLOOD PRESSURE: 143 MMHG | OXYGEN SATURATION: 98 % | HEIGHT: 74 IN

## 2024-08-03 DIAGNOSIS — T83.511A URINARY TRACT INFECTION ASSOCIATED WITH INDWELLING URETHRAL CATHETER, INITIAL ENCOUNTER (HCC): ICD-10-CM

## 2024-08-03 DIAGNOSIS — N39.0 URINARY TRACT INFECTION ASSOCIATED WITH INDWELLING URETHRAL CATHETER, INITIAL ENCOUNTER (HCC): ICD-10-CM

## 2024-08-03 DIAGNOSIS — R33.8 ACUTE URINARY RETENTION: Primary | ICD-10-CM

## 2024-08-03 LAB
ALBUMIN SERPL-MCNC: 4.3 G/DL (ref 3.4–5)
ALBUMIN/GLOB SERPL: 1.2 {RATIO} (ref 1.1–2.2)
ALP SERPL-CCNC: 68 U/L (ref 40–129)
ALT SERPL-CCNC: 37 U/L (ref 10–40)
ANION GAP SERPL CALCULATED.3IONS-SCNC: 13 MMOL/L (ref 3–16)
AST SERPL-CCNC: 35 U/L (ref 15–37)
BACTERIA URNS QL MICRO: ABNORMAL /HPF
BASOPHILS # BLD: 0 K/UL (ref 0–0.2)
BASOPHILS NFR BLD: 0.2 %
BILIRUB SERPL-MCNC: 0.6 MG/DL (ref 0–1)
BILIRUB UR QL STRIP.AUTO: NEGATIVE
BUN SERPL-MCNC: 12 MG/DL (ref 7–20)
CALCIUM SERPL-MCNC: 10.2 MG/DL (ref 8.3–10.6)
CHLORIDE SERPL-SCNC: 106 MMOL/L (ref 99–110)
CLARITY UR: CLEAR
CO2 SERPL-SCNC: 22 MMOL/L (ref 21–32)
COLOR UR: YELLOW
CREAT SERPL-MCNC: 1.1 MG/DL (ref 0.8–1.3)
DEPRECATED RDW RBC AUTO: 12.8 % (ref 12.4–15.4)
EOSINOPHIL # BLD: 0.1 K/UL (ref 0–0.6)
EOSINOPHIL NFR BLD: 0.9 %
EPI CELLS #/AREA URNS HPF: ABNORMAL /HPF (ref 0–5)
GFR SERPLBLD CREATININE-BSD FMLA CKD-EPI: 74 ML/MIN/{1.73_M2}
GLUCOSE SERPL-MCNC: 131 MG/DL (ref 70–99)
GLUCOSE UR STRIP.AUTO-MCNC: NEGATIVE MG/DL
HCT VFR BLD AUTO: 44.4 % (ref 40.5–52.5)
HGB BLD-MCNC: 15 G/DL (ref 13.5–17.5)
HGB UR QL STRIP.AUTO: ABNORMAL
IMM GRANULOCYTES # BLD: 0 K/UL (ref 0–0.2)
IMM GRANULOCYTES NFR BLD: 0.1 %
KETONES UR STRIP.AUTO-MCNC: NEGATIVE MG/DL
LACTATE BLDV-SCNC: 1.7 MMOL/L (ref 0.4–1.9)
LEUKOCYTE ESTERASE UR QL STRIP.AUTO: ABNORMAL
LYMPHOCYTES # BLD: 1.2 K/UL (ref 1–5.1)
LYMPHOCYTES NFR BLD: 11.9 %
MCH RBC QN AUTO: 31.5 PG (ref 26–34)
MCHC RBC AUTO-ENTMCNC: 33.8 G/DL (ref 32–36.4)
MCV RBC AUTO: 93.3 FL (ref 80–100)
MONOCYTES # BLD: 0.5 K/UL (ref 0–1.3)
MONOCYTES NFR BLD: 4.8 %
NEUTROPHILS # BLD: 8.6 K/UL (ref 1.7–7.7)
NEUTROPHILS NFR BLD: 82.1 %
NITRITE UR QL STRIP.AUTO: POSITIVE
PH UR STRIP.AUTO: 5.5 [PH] (ref 5–8)
PLATELET # BLD AUTO: 171 K/UL (ref 135–450)
PMV BLD AUTO: 9.1 FL (ref 5–10.5)
POTASSIUM SERPL-SCNC: 3.9 MMOL/L (ref 3.5–5.1)
PROT SERPL-MCNC: 8 G/DL (ref 6.4–8.2)
PROT UR STRIP.AUTO-MCNC: 100 MG/DL
RBC # BLD AUTO: 4.76 M/UL (ref 4.2–5.9)
RBC #/AREA URNS HPF: ABNORMAL /HPF (ref 0–4)
SODIUM SERPL-SCNC: 141 MMOL/L (ref 136–145)
SP GR UR STRIP.AUTO: 1.01 (ref 1–1.03)
UA COMPLETE W REFLEX CULTURE PNL UR: YES
UA DIPSTICK W REFLEX MICRO PNL UR: YES
URN SPEC COLLECT METH UR: ABNORMAL
UROBILINOGEN UR STRIP-ACNC: 0.2 E.U./DL
WBC # BLD AUTO: 10.4 K/UL (ref 4–11)
WBC #/AREA URNS HPF: ABNORMAL /HPF (ref 0–5)

## 2024-08-03 PROCEDURE — 87077 CULTURE AEROBIC IDENTIFY: CPT

## 2024-08-03 PROCEDURE — 80053 COMPREHEN METABOLIC PANEL: CPT

## 2024-08-03 PROCEDURE — 51798 US URINE CAPACITY MEASURE: CPT

## 2024-08-03 PROCEDURE — 6360000002 HC RX W HCPCS: Performed by: EMERGENCY MEDICINE

## 2024-08-03 PROCEDURE — 87186 SC STD MICRODIL/AGAR DIL: CPT

## 2024-08-03 PROCEDURE — 96375 TX/PRO/DX INJ NEW DRUG ADDON: CPT

## 2024-08-03 PROCEDURE — 2580000003 HC RX 258: Performed by: EMERGENCY MEDICINE

## 2024-08-03 PROCEDURE — 36415 COLL VENOUS BLD VENIPUNCTURE: CPT

## 2024-08-03 PROCEDURE — 87040 BLOOD CULTURE FOR BACTERIA: CPT

## 2024-08-03 PROCEDURE — 99284 EMERGENCY DEPT VISIT MOD MDM: CPT

## 2024-08-03 PROCEDURE — 83605 ASSAY OF LACTIC ACID: CPT

## 2024-08-03 PROCEDURE — 81001 URINALYSIS AUTO W/SCOPE: CPT

## 2024-08-03 PROCEDURE — 85025 COMPLETE CBC W/AUTO DIFF WBC: CPT

## 2024-08-03 PROCEDURE — 96374 THER/PROPH/DIAG INJ IV PUSH: CPT

## 2024-08-03 PROCEDURE — 87086 URINE CULTURE/COLONY COUNT: CPT

## 2024-08-03 RX ORDER — 0.9 % SODIUM CHLORIDE 0.9 %
30 INTRAVENOUS SOLUTION INTRAVENOUS ONCE
Status: COMPLETED | OUTPATIENT
Start: 2024-08-03 | End: 2024-08-03

## 2024-08-03 RX ORDER — CEFUROXIME AXETIL 250 MG/1
250 TABLET ORAL 2 TIMES DAILY
Qty: 20 TABLET | Refills: 0 | Status: SHIPPED | OUTPATIENT
Start: 2024-08-03 | End: 2024-08-13

## 2024-08-03 RX ORDER — ONDANSETRON 2 MG/ML
4 INJECTION INTRAMUSCULAR; INTRAVENOUS ONCE
Status: COMPLETED | OUTPATIENT
Start: 2024-08-03 | End: 2024-08-03

## 2024-08-03 RX ORDER — ONDANSETRON 4 MG/1
4 TABLET, ORALLY DISINTEGRATING ORAL 4 TIMES DAILY PRN
Qty: 20 TABLET | Refills: 0 | Status: SHIPPED | OUTPATIENT
Start: 2024-08-03

## 2024-08-03 RX ADMIN — SODIUM CHLORIDE 2466 ML: 9 INJECTION, SOLUTION INTRAVENOUS at 08:59

## 2024-08-03 RX ADMIN — ONDANSETRON 4 MG: 2 INJECTION INTRAMUSCULAR; INTRAVENOUS at 09:01

## 2024-08-03 RX ADMIN — WATER 1000 MG: 1 INJECTION INTRAMUSCULAR; INTRAVENOUS; SUBCUTANEOUS at 09:16

## 2024-08-03 ASSESSMENT — PAIN SCALES - GENERAL
PAINLEVEL_OUTOF10: 2
PAINLEVEL_OUTOF10: 6

## 2024-08-03 ASSESSMENT — LIFESTYLE VARIABLES
HOW MANY STANDARD DRINKS CONTAINING ALCOHOL DO YOU HAVE ON A TYPICAL DAY: PATIENT DOES NOT DRINK
HOW OFTEN DO YOU HAVE A DRINK CONTAINING ALCOHOL: NEVER

## 2024-08-03 ASSESSMENT — PAIN - FUNCTIONAL ASSESSMENT
PAIN_FUNCTIONAL_ASSESSMENT: 0-10

## 2024-08-03 ASSESSMENT — PAIN DESCRIPTION - DESCRIPTORS: DESCRIPTORS: PRESSURE

## 2024-08-03 ASSESSMENT — PAIN DESCRIPTION - LOCATION: LOCATION: ABDOMEN

## 2024-08-03 NOTE — ED TRIAGE NOTES
Pt. Has had a catheter for almost 2 years, took catheter out last night due to it being dirty and he thought he would be able to urinate.  Does not have a urologist

## 2024-08-03 NOTE — ED PROVIDER NOTES
then started having nausea and vomiting so we did order a septic workup on the patient as well as a fluid bolus.  He was given 1 g of Rocephin intravenously.  Overall he is significantly improved.  Diagnostics are all very reassuring for the patient not being septic.  Also, he does not meet SIRS criteria.  I did contact on-call urology Dr. Hudson to provide the patient with some follow-up.    - History obtained from: Patient  - Records reviewed: Today's diagnostics      - Consults:   None     - Pertinent social determinants: No primary care listed  - Tests considered: As above  - Disposition considerations: Discharged    Chronic Conditions: Chronic urinary tract retention      Is this patient to be included in the SEP-1 Core Measure?  No   Exclusion criteria - the patient is NOT to be included for SEP-1 Core Measure due to:  2+ SIRS criteria are not met    IJames MD, am the primary clinician of record.     During the patient's ED course, the patient was given:  Medications   sodium chloride 0.9 % bolus 2,466 mL (0 mLs IntraVENous Stopped 8/3/24 1027)   cefTRIAXone (ROCEPHIN) 1,000 mg in sterile water 10 mL IV syringe (1,000 mg IntraVENous Given 8/3/24 0916)   ondansetron (ZOFRAN) injection 4 mg (4 mg IntraVENous Given 8/3/24 0901)        Patient was given prescriptions for the following medications. I counseled the patient as to how to take these medications.  Discharge Medication List as of 8/3/2024 10:31 AM        START taking these medications    Details   cefUROXime (CEFTIN) 250 MG tablet Take 1 tablet by mouth 2 times daily for 10 days, Disp-20 tablet, R-0Normal      ondansetron (ZOFRAN-ODT) 4 MG disintegrating tablet Take 1 tablet by mouth 4 times daily as needed for Nausea or Vomiting, Disp-20 tablet, R-0Normal             Patient instructed to follow up with:   Rex Hudson MD  [address] [address] [phone]    Schedule an appointment as soon as possible  for a visit       [facility]  [address] [facility] [address]  [phone]    If symptoms worsen      All questions were answered and the patient/family expressed understanding and agreement with the plan.     CRITICAL CARE  N/A    CLINICAL IMPRESSION  1. Acute urinary retention    2. Urinary tract infection associated with indwelling urethral catheter, initial encounter ([facility])        DISPOSITION  Decision To Discharge 08/03/2024 09:54:48 AM     James Barajas MD    Note: This chart was created using voice recognition dictation software. Efforts were made by me to ensure accuracy, however some errors may be present due to limitations of this technology and occasionally words are not transcribed correctly.       James Barajas MD  08/03/24 1120

## 2024-08-04 LAB
BACTERIA BLD CULT ORG #2: NORMAL
BACTERIA BLD CULT: NORMAL
BACTERIA UR CULT: ABNORMAL
BACTERIA UR CULT: ABNORMAL
ORGANISM: ABNORMAL
ORGANISM: ABNORMAL

## 2024-08-06 LAB
BACTERIA UR CULT: ABNORMAL
BACTERIA UR CULT: ABNORMAL
ORGANISM: ABNORMAL
ORGANISM: ABNORMAL

## 2024-08-07 LAB
BACTERIA BLD CULT ORG #2: NORMAL
BACTERIA BLD CULT: NORMAL

## 2024-12-11 ENCOUNTER — HOSPITAL ENCOUNTER (EMERGENCY)
Age: 64
Discharge: HOME | End: 2024-12-11
Attending: EMERGENCY MEDICINE
Payer: MEDICAID

## 2024-12-11 VITALS
HEART RATE: 90 BPM | OXYGEN SATURATION: 99 % | DIASTOLIC BLOOD PRESSURE: 89 MMHG | RESPIRATION RATE: 18 BRPM | SYSTOLIC BLOOD PRESSURE: 164 MMHG | TEMPERATURE: 98.1 F

## 2024-12-11 DIAGNOSIS — T83.091A OBSTRUCTED FOLEY CATHETER, INITIAL ENCOUNTER (HCC): Primary | ICD-10-CM

## 2024-12-11 DIAGNOSIS — N39.0 URINARY TRACT INFECTION ASSOCIATED WITH INDWELLING URETHRAL CATHETER, INITIAL ENCOUNTER (HCC): ICD-10-CM

## 2024-12-11 DIAGNOSIS — T83.511A URINARY TRACT INFECTION ASSOCIATED WITH INDWELLING URETHRAL CATHETER, INITIAL ENCOUNTER (HCC): ICD-10-CM

## 2024-12-11 LAB
AMORPH SED URNS QL MICRO: ABNORMAL /HPF
BACTERIA URNS QL MICRO: ABNORMAL /HPF
BILIRUB UR QL STRIP.AUTO: NEGATIVE
CLARITY UR: ABNORMAL
COLOR UR: YELLOW
EPI CELLS #/AREA URNS HPF: ABNORMAL /HPF (ref 0–5)
GLUCOSE UR STRIP.AUTO-MCNC: NEGATIVE MG/DL
HGB UR QL STRIP.AUTO: ABNORMAL
KETONES UR STRIP.AUTO-MCNC: ABNORMAL MG/DL
LEUKOCYTE ESTERASE UR QL STRIP.AUTO: ABNORMAL
NITRITE UR QL STRIP.AUTO: POSITIVE
PH UR STRIP.AUTO: 6.5 [PH] (ref 5–8)
PROT UR STRIP.AUTO-MCNC: >=300 MG/DL
RBC #/AREA URNS HPF: >100 /HPF (ref 0–4)
SP GR UR STRIP.AUTO: >=1.03 (ref 1–1.03)
UA COMPLETE W REFLEX CULTURE PNL UR: YES
UA DIPSTICK W REFLEX MICRO PNL UR: YES
URN SPEC COLLECT METH UR: ABNORMAL
UROBILINOGEN UR STRIP-ACNC: 1 E.U./DL
WBC #/AREA URNS HPF: ABNORMAL /HPF (ref 0–5)

## 2024-12-11 PROCEDURE — 99283 EMERGENCY DEPT VISIT LOW MDM: CPT

## 2024-12-11 PROCEDURE — 87086 URINE CULTURE/COLONY COUNT: CPT

## 2024-12-11 PROCEDURE — 51702 INSERT TEMP BLADDER CATH: CPT

## 2024-12-11 PROCEDURE — 81001 URINALYSIS AUTO W/SCOPE: CPT

## 2024-12-11 RX ORDER — NITROFURANTOIN 25; 75 MG/1; MG/1
100 CAPSULE ORAL 2 TIMES DAILY
Qty: 20 CAPSULE | Refills: 0 | Status: SHIPPED | OUTPATIENT
Start: 2024-12-11 | End: 2024-12-21

## 2024-12-11 ASSESSMENT — PAIN SCALES - GENERAL: PAINLEVEL_OUTOF10: 0

## 2024-12-11 NOTE — ED TRIAGE NOTES
Patient presents to ED for c/o needing his smith catheter changed. Patient states it has been in for 4 months since the last catheter was placed. Patient states he is urinating around the catheter, states he does not wear a bag, he just puts a plug in it and drains it every few hours. Patient states he thinks he may also have a UTI and needs antibiotics. Patient states he called urology last time he was seen here but states he wants to seek a doctor elsewhere.

## 2024-12-11 NOTE — DISCHARGE INSTRUCTIONS
Take antibiotics as prescribed until completed.    Call your urologist for follow-up in the next couple of weeks.    Return as needed for any new symptoms of concern.

## 2024-12-11 NOTE — ED PROVIDER NOTES
[facility]  eMERGENCY dEPARTMENT eNCOUnter      Pt Name: Etienne Mccarty  MRN: [id number]  Birthdate 1960  Date of evaluation: 12/11/2024  Provider: CLAUDIA REYNOSO MD    CHIEF COMPLAINT       Chief Complaint   Patient presents with    Urinary Catheter     Patient presents to ED for c/o needing his smith catheter changed. Patient states it has been in for 4 months. Patient states he is urinating around it. Patient states he thinks he may also have a UTI and needs antibiotics. Patient states he called urology last time he was seen here but states he wants to seek a doctor elsewhere.          CRITICAL CARE TIME   Total Critical Care time was 0 minutes, excluding separately reportable procedures.  There was a high probability of clinically significant/life threatening deterioration in the patient's condition which required my urgent intervention.        HISTORY OF PRESENT ILLNESS  (Location/Symptom, Timing/Onset, Context/Setting, Quality, Duration, Modifying Factors, Severity.)   History From: Patient  Limitations to history : None    Etienne Mccarty is a 64 y.o. male who presents to the emergency department stating he is here to have his Smith catheter changed.  This patient's had a chronic indwelling Smith catheter for years.  He was actually seen here in August to have a catheter replaced because he had not had it changed for 2 years.  This catheter has been in since August.  He did not follow-up with urology.  He states he called the urologist but there were problems with him awaiting them some money so he could not get a follow-up appointment.  He states for the last day or so he has had decreased urine output from the catheter and he is leaking around the catheter.  He thinks it is blocked off.  No fever.  No back or flank pain.  No abdominal pain.      Nursing Notes were reviewed and I agree.      SCREENINGS        Marie Coma Scale  Eye Opening: Spontaneous  Best Verbal

## 2024-12-12 LAB — BACTERIA UR CULT: NORMAL

## 2024-12-17 ENCOUNTER — HOSPITAL ENCOUNTER (EMERGENCY)
Age: 64
Discharge: HOME | End: 2024-12-17
Attending: EMERGENCY MEDICINE
Payer: MEDICAID

## 2024-12-17 VITALS
BODY MASS INDEX: 25.11 KG/M2 | WEIGHT: 201.94 LBS | DIASTOLIC BLOOD PRESSURE: 89 MMHG | SYSTOLIC BLOOD PRESSURE: 143 MMHG | HEART RATE: 108 BPM | RESPIRATION RATE: 18 BRPM | OXYGEN SATURATION: 97 % | TEMPERATURE: 98.1 F | HEIGHT: 75 IN

## 2024-12-17 DIAGNOSIS — T83.9XXA PROBLEM WITH FOLEY CATHETER, INITIAL ENCOUNTER (HCC): ICD-10-CM

## 2024-12-17 DIAGNOSIS — N30.00 ACUTE CYSTITIS WITHOUT HEMATURIA: ICD-10-CM

## 2024-12-17 DIAGNOSIS — R33.8 ACUTE URINARY RETENTION: Primary | ICD-10-CM

## 2024-12-17 LAB
ANION GAP SERPL CALCULATED.3IONS-SCNC: 11 MMOL/L (ref 3–16)
BACTERIA URNS QL MICRO: ABNORMAL /HPF
BASOPHILS # BLD: 0 K/UL (ref 0–0.2)
BASOPHILS NFR BLD: 0.2 %
BILIRUB UR QL STRIP.AUTO: NEGATIVE
BUN SERPL-MCNC: 13 MG/DL (ref 7–20)
CALCIUM SERPL-MCNC: 9.2 MG/DL (ref 8.3–10.6)
CHLORIDE SERPL-SCNC: 105 MMOL/L (ref 99–110)
CLARITY UR: CLEAR
CO2 SERPL-SCNC: 23 MMOL/L (ref 21–32)
COLOR UR: YELLOW
CREAT SERPL-MCNC: 1 MG/DL (ref 0.8–1.3)
DEPRECATED RDW RBC AUTO: 12.7 % (ref 12.4–15.4)
EOSINOPHIL # BLD: 0.1 K/UL (ref 0–0.6)
EOSINOPHIL NFR BLD: 0.6 %
EPI CELLS #/AREA URNS HPF: ABNORMAL /HPF (ref 0–5)
GFR SERPLBLD CREATININE-BSD FMLA CKD-EPI: 83 ML/MIN/{1.73_M2}
GLUCOSE SERPL-MCNC: 114 MG/DL (ref 70–99)
GLUCOSE UR STRIP.AUTO-MCNC: NEGATIVE MG/DL
HCT VFR BLD AUTO: 42.8 % (ref 40.5–52.5)
HGB BLD-MCNC: 13.9 G/DL (ref 13.5–17.5)
HGB UR QL STRIP.AUTO: ABNORMAL
IMM GRANULOCYTES # BLD: 0 K/UL (ref 0–0.2)
IMM GRANULOCYTES NFR BLD: 0.1 %
KETONES UR STRIP.AUTO-MCNC: NEGATIVE MG/DL
LEUKOCYTE ESTERASE UR QL STRIP.AUTO: ABNORMAL
LYMPHOCYTES # BLD: 1 K/UL (ref 1–5.1)
LYMPHOCYTES NFR BLD: 12 %
MCH RBC QN AUTO: 31.4 PG (ref 26–34)
MCHC RBC AUTO-ENTMCNC: 32.5 G/DL (ref 32–36.4)
MCV RBC AUTO: 96.6 FL (ref 80–100)
MONOCYTES # BLD: 0.5 K/UL (ref 0–1.3)
MONOCYTES NFR BLD: 5.5 %
NEUTROPHILS # BLD: 6.8 K/UL (ref 1.7–7.7)
NEUTROPHILS NFR BLD: 81.6 %
NITRITE UR QL STRIP.AUTO: POSITIVE
PH UR STRIP.AUTO: 7 [PH] (ref 5–8)
PLATELET # BLD AUTO: 202 K/UL (ref 135–450)
PMV BLD AUTO: 8.9 FL (ref 5–10.5)
POTASSIUM SERPL-SCNC: 4.1 MMOL/L (ref 3.5–5.1)
PROT UR STRIP.AUTO-MCNC: 100 MG/DL
RBC # BLD AUTO: 4.43 M/UL (ref 4.2–5.9)
RBC #/AREA URNS HPF: ABNORMAL /HPF (ref 0–4)
SODIUM SERPL-SCNC: 139 MMOL/L (ref 136–145)
SP GR UR STRIP.AUTO: 1.01 (ref 1–1.03)
UA COMPLETE W REFLEX CULTURE PNL UR: YES
UA DIPSTICK W REFLEX MICRO PNL UR: YES
URN SPEC COLLECT METH UR: ABNORMAL
UROBILINOGEN UR STRIP-ACNC: 0.2 E.U./DL
WBC # BLD AUTO: 8.4 K/UL (ref 4–11)
WBC #/AREA URNS HPF: ABNORMAL /HPF (ref 0–5)

## 2024-12-17 PROCEDURE — 81001 URINALYSIS AUTO W/SCOPE: CPT

## 2024-12-17 PROCEDURE — 80048 BASIC METABOLIC PNL TOTAL CA: CPT

## 2024-12-17 PROCEDURE — 36415 COLL VENOUS BLD VENIPUNCTURE: CPT

## 2024-12-17 PROCEDURE — 85025 COMPLETE CBC W/AUTO DIFF WBC: CPT

## 2024-12-17 PROCEDURE — 87186 SC STD MICRODIL/AGAR DIL: CPT

## 2024-12-17 PROCEDURE — 51702 INSERT TEMP BLADDER CATH: CPT

## 2024-12-17 PROCEDURE — 87077 CULTURE AEROBIC IDENTIFY: CPT

## 2024-12-17 PROCEDURE — 99283 EMERGENCY DEPT VISIT LOW MDM: CPT

## 2024-12-17 PROCEDURE — 87086 URINE CULTURE/COLONY COUNT: CPT

## 2024-12-17 RX ORDER — CEPHALEXIN 500 MG/1
500 CAPSULE ORAL 3 TIMES DAILY
Qty: 21 CAPSULE | Refills: 0 | Status: SHIPPED | OUTPATIENT
Start: 2024-12-17 | End: 2024-12-24

## 2024-12-17 ASSESSMENT — PAIN DESCRIPTION - LOCATION: LOCATION: ABDOMEN

## 2024-12-17 ASSESSMENT — PAIN DESCRIPTION - ORIENTATION: ORIENTATION: LOWER

## 2024-12-17 ASSESSMENT — PAIN - FUNCTIONAL ASSESSMENT: PAIN_FUNCTIONAL_ASSESSMENT: 0-10

## 2024-12-17 ASSESSMENT — PAIN DESCRIPTION - PAIN TYPE: TYPE: ACUTE PAIN

## 2024-12-17 ASSESSMENT — PAIN SCALES - GENERAL: PAINLEVEL_OUTOF10: 6

## 2024-12-17 NOTE — ED PROVIDER NOTES
reviewed.  Urinalysis reveals nitrite positive, moderate leukocytes, 10-20 white cells and 3+ bacteria.    I advised him to discontinue Macrobid.  He will be given Keflex 500 mg 3 times daily for 7 days while awaiting repeat culture.    White blood cell count is 8.4.  Hemoglobin 13.9.  Platelets 202.  Creatinine 1.0.  Glucose 114.  Electrolytes are normal.    Drink plenty of fluids.  Follow-up with a primary care physician in 1 to 2 days for reexamination.  Call today for an appointment.  If condition worsens or new symptoms develop, return immediately to the emergency department.     He was given Beltrán catheter instructions.  He is stable for discharge and outpatient management.    I am the primary attending of record.    CRITICAL CARE TIME   Total Critical Care time was 0 minutes, excluding separately reportable procedures.  There was a high probability of clinically significant/life threatening deterioration in the patient's condition which required my urgent intervention.      CONSULTS:  None    PROCEDURES:  Unless otherwise noted below, none     Procedures        FINAL IMPRESSION      1. Acute urinary retention    2. Problem with Beltrán catheter, initial encounter ([facility])    3. Acute cystitis without hematuria          DISPOSITION/PLAN   DISPOSITION Decision To Discharge 12/17/2024 10:53:55 AM      PATIENT REFERRED TO:  No follow-up provider specified.    DISCHARGE MEDICATIONS:  New Prescriptions    CEPHALEXIN (KEFLEX) 500 MG CAPSULE    Take 1 capsule by mouth 3 times daily for 7 days     Controlled Substances Monitoring:          No data to display                (Please note that portions of this note were completed with a voice recognition program.  Efforts were made to edit the dictations but occasionally words are mis-transcribed.)    ALAN SOLANO DO (electronically signed)  Attending Emergency Physician            Alan Solano DO  12/17/24 0686

## 2024-12-17 NOTE — ED NOTES
18Fr smith catheter placed at this time, urine output noted in drainage bag. Pt tolerate well with no new complaints.

## 2024-12-17 NOTE — DISCHARGE INSTRUCTIONS
Discontinue nitrofurantoin/Macrobid.    Drink plenty of fluids.  Follow-up with a primary care physician in 1 to 2 days for reexamination.  Call today for an appointment.  If condition worsens or new symptoms develop, return immediately to the emergency department.

## 2024-12-19 LAB
BACTERIA UR CULT: ABNORMAL
ORGANISM: ABNORMAL

## 2025-03-03 ENCOUNTER — HOSPITAL ENCOUNTER (EMERGENCY)
Age: 65
Discharge: HOME | End: 2025-03-03
Attending: EMERGENCY MEDICINE
Payer: MEDICAID

## 2025-03-03 VITALS
BODY MASS INDEX: 23.99 KG/M2 | HEIGHT: 75 IN | OXYGEN SATURATION: 96 % | TEMPERATURE: 97.9 F | SYSTOLIC BLOOD PRESSURE: 137 MMHG | DIASTOLIC BLOOD PRESSURE: 99 MMHG | WEIGHT: 192.9 LBS | HEART RATE: 84 BPM | RESPIRATION RATE: 17 BRPM

## 2025-03-03 DIAGNOSIS — Z46.6 ENCOUNTER FOR FOLEY CATHETER REPLACEMENT: ICD-10-CM

## 2025-03-03 DIAGNOSIS — N39.0 URINARY TRACT INFECTION ASSOCIATED WITH INDWELLING URETHRAL CATHETER, INITIAL ENCOUNTER: Primary | ICD-10-CM

## 2025-03-03 DIAGNOSIS — T83.511A URINARY TRACT INFECTION ASSOCIATED WITH INDWELLING URETHRAL CATHETER, INITIAL ENCOUNTER: Primary | ICD-10-CM

## 2025-03-03 LAB
BILIRUB UR QL STRIP.AUTO: NEGATIVE
CHARACTER UR: ABNORMAL
CLARITY UR: ABNORMAL
COLOR UR: ABNORMAL
CRYSTALS URNS MICRO: ABNORMAL /HPF
EPI CELLS #/AREA URNS HPF: ABNORMAL /HPF (ref 0–5)
GLUCOSE UR STRIP.AUTO-MCNC: NEGATIVE MG/DL
HGB UR QL STRIP.AUTO: ABNORMAL
KETONES UR STRIP.AUTO-MCNC: NEGATIVE MG/DL
LEUKOCYTE ESTERASE UR QL STRIP.AUTO: ABNORMAL
NITRITE UR QL STRIP.AUTO: POSITIVE
PH UR STRIP.AUTO: 6.5 [PH] (ref 5–8)
PROT UR STRIP.AUTO-MCNC: 30 MG/DL
RBC #/AREA URNS HPF: >100 /HPF (ref 0–4)
SP GR UR STRIP.AUTO: 1.01 (ref 1–1.03)
UA COMPLETE W REFLEX CULTURE PNL UR: YES
UA DIPSTICK W REFLEX MICRO PNL UR: YES
URN SPEC COLLECT METH UR: ABNORMAL
UROBILINOGEN UR STRIP-ACNC: 0.2 E.U./DL
WBC #/AREA URNS HPF: ABNORMAL /HPF (ref 0–5)

## 2025-03-03 PROCEDURE — 87086 URINE CULTURE/COLONY COUNT: CPT

## 2025-03-03 PROCEDURE — 81001 URINALYSIS AUTO W/SCOPE: CPT

## 2025-03-03 PROCEDURE — 99283 EMERGENCY DEPT VISIT LOW MDM: CPT

## 2025-03-03 RX ORDER — SULFAMETHOXAZOLE AND TRIMETHOPRIM 800; 160 MG/1; MG/1
1 TABLET ORAL 2 TIMES DAILY
Qty: 14 TABLET | Refills: 0 | Status: SHIPPED | OUTPATIENT
Start: 2025-03-03 | End: 2025-03-10

## 2025-03-03 ASSESSMENT — PAIN SCALES - GENERAL: PAINLEVEL_OUTOF10: 0

## 2025-03-03 NOTE — ED TRIAGE NOTES
Patient arrived to the ED ambulatory from home w/ complaints of urinary catheter.     Patient/EMS reports states Needs a new catheter placed as the last time he was here he had it placed but hasn't had time to establish a urology or a PCP appt d/t health issues. Pt has the catheter for prostate issues. Pt reports no issues w/ catheter at this time.     Patient A&O x 4, VSS w/ exception of slight HTN,

## 2025-03-03 NOTE — ED NOTES

## 2025-03-03 NOTE — ED PROVIDER NOTES
Stable           PATIENT REFERRED TO:  Margaret Lua MD  [address] [facility] [address]  [place] [facility] [address] [phone]    Schedule an appointment as soon as possible for a visit in 1 week        DISCHARGE MEDICATIONS:  New Prescriptions    SULFAMETHOXAZOLE-TRIMETHOPRIM (BACTRIM DS;SEPTRA DS) 800-160 MG PER TABLET    Take 1 tablet by mouth 2 times daily for 7 days       (Please note that portions of this note were completed with a voice recognition program.  Efforts were made to edit the dictations but occasionally words are mis-transcribed.)    JARON REYNOSO MD  Attending Emergency Physician        Jaron Reynoso MD  03/03/25 7283

## 2025-03-03 NOTE — ED NOTES
After chart review looks like a 18 Korean urinary cath was placed at last visit; EDMD gave verbal orders to remove current cath in place and replace w/ another 18 fr cath and collect UA on newly place cath.

## 2025-03-03 NOTE — DISCHARGE INSTRUCTIONS
Take antibiotics as prescribed until completed.    Make a follow-up appointment with urology in the next week or 2.    Return as needed for further problems

## 2025-03-05 LAB — BACTERIA UR CULT: NORMAL

## 2025-05-02 ENCOUNTER — HOSPITAL ENCOUNTER (EMERGENCY)
Age: 65
Discharge: HOME | End: 2025-05-02
Attending: EMERGENCY MEDICINE
Payer: MEDICAID

## 2025-05-02 VITALS
DIASTOLIC BLOOD PRESSURE: 92 MMHG | TEMPERATURE: 97.8 F | WEIGHT: 188.49 LBS | HEART RATE: 60 BPM | OXYGEN SATURATION: 97 % | RESPIRATION RATE: 18 BRPM | SYSTOLIC BLOOD PRESSURE: 145 MMHG | BODY MASS INDEX: 23.56 KG/M2

## 2025-05-02 DIAGNOSIS — N30.01 ACUTE CYSTITIS WITH HEMATURIA: ICD-10-CM

## 2025-05-02 DIAGNOSIS — T83.9XXA PROBLEM WITH FOLEY CATHETER, INITIAL ENCOUNTER: Primary | ICD-10-CM

## 2025-05-02 LAB
BACTERIA URNS QL MICRO: ABNORMAL /HPF
BILIRUB UR QL STRIP.AUTO: NEGATIVE
CLARITY UR: ABNORMAL
COLOR UR: YELLOW
EPI CELLS #/AREA URNS HPF: ABNORMAL /HPF (ref 0–5)
GLUCOSE UR STRIP.AUTO-MCNC: NEGATIVE MG/DL
HGB UR QL STRIP.AUTO: ABNORMAL
KETONES UR STRIP.AUTO-MCNC: NEGATIVE MG/DL
LEUKOCYTE ESTERASE UR QL STRIP.AUTO: ABNORMAL
NITRITE UR QL STRIP.AUTO: POSITIVE
PH UR STRIP.AUTO: 6 [PH] (ref 5–8)
PROT UR STRIP.AUTO-MCNC: 100 MG/DL
RBC #/AREA URNS HPF: ABNORMAL /HPF (ref 0–4)
SP GR UR STRIP.AUTO: 1.02 (ref 1–1.03)
UA COMPLETE W REFLEX CULTURE PNL UR: YES
UA DIPSTICK W REFLEX MICRO PNL UR: YES
URN SPEC COLLECT METH UR: ABNORMAL
UROBILINOGEN UR STRIP-ACNC: 0.2 E.U./DL
WBC #/AREA URNS HPF: >100 /HPF (ref 0–5)

## 2025-05-02 PROCEDURE — 87086 URINE CULTURE/COLONY COUNT: CPT

## 2025-05-02 PROCEDURE — 81001 URINALYSIS AUTO W/SCOPE: CPT

## 2025-05-02 PROCEDURE — 99283 EMERGENCY DEPT VISIT LOW MDM: CPT

## 2025-05-02 PROCEDURE — 51702 INSERT TEMP BLADDER CATH: CPT

## 2025-05-02 RX ORDER — LIDOCAINE HYDROCHLORIDE 20 MG/ML
JELLY TOPICAL
Status: DISCONTINUED | OUTPATIENT
Start: 2025-05-02 | End: 2025-05-02 | Stop reason: HOSPADM

## 2025-05-02 RX ORDER — CEFDINIR 300 MG/1
300 CAPSULE ORAL 2 TIMES DAILY
Qty: 14 CAPSULE | Refills: 0 | Status: SHIPPED | OUTPATIENT
Start: 2025-05-02 | End: 2025-05-09

## 2025-05-02 NOTE — ED PROVIDER NOTES
[facility]    EMERGENCY DEPARTMENT ENCOUNTER        Patient Name: Etienne Mccarty  MRN: [id number]  Birthdate 1960  Date of evaluation: 5/2/2025  PCP: No primary care provider on file.  Note Started: 11:16 AM EDT 5/2/25    CHIEF COMPLAINT       Urinary Catheter (Pt wants his smith changed )      HISTORY OF PRESENT ILLNESS: 1 or more Elements       Etienne Mccarty is a 65 y.o. male who presents to the emergency department for evaluation of Smith catheter that is not draining.  Patient reports he is due to get his Smith catheter replaced.  Says he was not able to follow-up with urology for a while, but got a new urology appointment in about a week.  He started having some crusting and darker urine and felt like he may have a urinary tract infection.  That he should not wait until his urology appointment so came in with request to get his Smith catheter replaced.  Denies having any systemic symptoms.  No other complaints.    No other complaints, modifying factors or associated symptoms.     History obtained by the patient unless stated otherwise as above on HPI.   No limitations unless specified as above on HPI.     Past medical history:   Past Medical History:   Diagnosis Date    Enlarged prostate     Kidney stones     Recurrent UTI     Tobacco use disorder        Past surgical history:   Past Surgical History:   Procedure Laterality Date    CYSTOSCOPY Right 4/19/2022    CYSTOSCOPY, HOLMIUM LASER LITHOTRIPSY OF BLADDER STONE performed by Raoul Mendoza MD at [facility] OR    LITHOTRIPSY         Home medications:   Prior to Admission medications    Medication Sig Start Date End Date Taking? Authorizing Provider   cefdinir (OMNICEF) 300 MG capsule Take 1 capsule by mouth 2 times daily for 7 days 5/2/25 5/9/25 Yes Yvette Rodgers MD   ondansetron (ZOFRAN-ODT) 4 MG disintegrating tablet Take 1 tablet by mouth 4 times daily as needed for Nausea or Vomiting 8/3/24   James Barajas MD  dictating provider for clarification.)         Yvette Rodgers MD  05/02/25 1124

## 2025-05-02 NOTE — DISCHARGE INSTRUCTIONS
Close follow up with urology as we discussed.   Take omnicef for UTI.   If persistent or worsening symptoms, or if you have any concerns, return to ED immediately.

## 2025-05-03 LAB — BACTERIA UR CULT: NORMAL

## (undated) DEVICE — CYSTO/BLADDER IRRIGATION SET, REGULATING CLAMP

## (undated) DEVICE — CATHETER URET 5FR L70CM OPN END SGL LUMN INJ HUB FLEXIMA

## (undated) DEVICE — SYRINGE MED 10ML SLIP TIP BLNT FILL AND LUERLOCK DISP

## (undated) DEVICE — GLOVE ORANGE PI 7   MSG9070

## (undated) DEVICE — POSITIONER HD W8XH4XL8.5IN RASPBERRY FOAM SLT

## (undated) DEVICE — TRAY PREP DRY W/ PREM GLV 2 APPL 6 SPNG 2 UNDPD 1 OVERWRAP

## (undated) DEVICE — TOTAL TRAY, DB, 100% SILI FOLEY, 16FR 10: Brand: MEDLINE

## (undated) DEVICE — Device: Brand: MEDEX

## (undated) DEVICE — Device

## (undated) DEVICE — BAG DRAINAGE NS

## (undated) DEVICE — GUIDEWIRE ENDOSCP L150CM DIA0.035IN TIP 3CM PTFE NIT

## (undated) DEVICE — SOLUTION IRRIGATION STRL H2O 1000 ML UROMATIC CONTAINER

## (undated) DEVICE — BAG,DRAINAGE,4L,A/R TOWER,LL,SLIDE TAP: Brand: MEDLINE

## (undated) DEVICE — CYSTO PACK: Brand: MEDLINE INDUSTRIES, INC.

## (undated) DEVICE — CONTAINER,SPECIMEN,OR STERILE,4OZ: Brand: MEDLINE

## (undated) DEVICE — SOLUTION IV IRRIG WATER 1000ML POUR BRL 2F7114

## (undated) DEVICE — GOWN,SURGICAL,AURORA,SLEEVE: Brand: MEDLINE